# Patient Record
Sex: MALE | Race: BLACK OR AFRICAN AMERICAN | NOT HISPANIC OR LATINO
[De-identification: names, ages, dates, MRNs, and addresses within clinical notes are randomized per-mention and may not be internally consistent; named-entity substitution may affect disease eponyms.]

---

## 2018-09-04 ENCOUNTER — APPOINTMENT (OUTPATIENT)
Dept: ORTHOPEDIC SURGERY | Facility: CLINIC | Age: 63
End: 2018-09-04

## 2018-09-04 PROBLEM — Z00.00 ENCOUNTER FOR PREVENTIVE HEALTH EXAMINATION: Status: ACTIVE | Noted: 2018-09-04

## 2021-06-01 ENCOUNTER — INPATIENT (INPATIENT)
Facility: HOSPITAL | Age: 66
LOS: 3 days | Discharge: HOME HEALTH | DRG: 553 | End: 2021-06-05
Attending: STUDENT IN AN ORGANIZED HEALTH CARE EDUCATION/TRAINING PROGRAM | Admitting: INTERNAL MEDICINE
Payer: COMMERCIAL

## 2021-06-01 VITALS
SYSTOLIC BLOOD PRESSURE: 134 MMHG | WEIGHT: 169.98 LBS | HEART RATE: 78 BPM | TEMPERATURE: 99 F | DIASTOLIC BLOOD PRESSURE: 65 MMHG | RESPIRATION RATE: 18 BRPM | OXYGEN SATURATION: 99 % | HEIGHT: 76 IN

## 2021-06-01 DIAGNOSIS — R63.8 OTHER SYMPTOMS AND SIGNS CONCERNING FOOD AND FLUID INTAKE: ICD-10-CM

## 2021-06-01 DIAGNOSIS — N18.6 END STAGE RENAL DISEASE: ICD-10-CM

## 2021-06-01 DIAGNOSIS — Z29.9 ENCOUNTER FOR PROPHYLACTIC MEASURES, UNSPECIFIED: ICD-10-CM

## 2021-06-01 DIAGNOSIS — M25.462 EFFUSION, LEFT KNEE: ICD-10-CM

## 2021-06-01 DIAGNOSIS — D64.9 ANEMIA, UNSPECIFIED: ICD-10-CM

## 2021-06-01 DIAGNOSIS — Z89.422 ACQUIRED ABSENCE OF OTHER LEFT TOE(S): Chronic | ICD-10-CM

## 2021-06-01 DIAGNOSIS — I10 ESSENTIAL (PRIMARY) HYPERTENSION: ICD-10-CM

## 2021-06-01 DIAGNOSIS — E11.9 TYPE 2 DIABETES MELLITUS WITHOUT COMPLICATIONS: ICD-10-CM

## 2021-06-01 LAB
ALBUMIN SERPL ELPH-MCNC: 3.7 G/DL — SIGNIFICANT CHANGE UP (ref 3.3–5)
ALP SERPL-CCNC: 234 U/L — HIGH (ref 40–120)
ALT FLD-CCNC: 45 U/L — SIGNIFICANT CHANGE UP (ref 10–45)
ANION GAP SERPL CALC-SCNC: 14 MMOL/L — SIGNIFICANT CHANGE UP (ref 5–17)
ANISOCYTOSIS BLD QL: SLIGHT — SIGNIFICANT CHANGE UP
APTT BLD: 32.9 SEC — SIGNIFICANT CHANGE UP (ref 27.5–35.5)
AST SERPL-CCNC: 26 U/L — SIGNIFICANT CHANGE UP (ref 10–40)
B PERT IGG+IGM PNL SER: SIGNIFICANT CHANGE UP
BASOPHILS # BLD AUTO: 0.07 K/UL — SIGNIFICANT CHANGE UP (ref 0–0.2)
BASOPHILS NFR BLD AUTO: 0.9 % — SIGNIFICANT CHANGE UP (ref 0–2)
BILIRUB SERPL-MCNC: 0.4 MG/DL — SIGNIFICANT CHANGE UP (ref 0.2–1.2)
BUN SERPL-MCNC: 25 MG/DL — HIGH (ref 7–23)
BURR CELLS BLD QL SMEAR: PRESENT — SIGNIFICANT CHANGE UP
CALCIUM SERPL-MCNC: 9.2 MG/DL — SIGNIFICANT CHANGE UP (ref 8.4–10.5)
CHLORIDE SERPL-SCNC: 92 MMOL/L — LOW (ref 96–108)
CO2 SERPL-SCNC: 26 MMOL/L — SIGNIFICANT CHANGE UP (ref 22–31)
COLOR FLD: SIGNIFICANT CHANGE UP
CREAT SERPL-MCNC: 4.47 MG/DL — HIGH (ref 0.5–1.3)
EOSINOPHIL # BLD AUTO: 0.06 K/UL — SIGNIFICANT CHANGE UP (ref 0–0.5)
EOSINOPHIL NFR BLD AUTO: 0.8 % — SIGNIFICANT CHANGE UP (ref 0–6)
FLUID INTAKE SUBSTANCE CLASS: SIGNIFICANT CHANGE UP
FLUID SEGMENTED GRANULOCYTES: 95 % — SIGNIFICANT CHANGE UP
GIANT PLATELETS BLD QL SMEAR: PRESENT — SIGNIFICANT CHANGE UP
GLUCOSE BLDC GLUCOMTR-MCNC: 311 MG/DL — HIGH (ref 70–99)
GLUCOSE FLD-MCNC: 215 MG/DL — SIGNIFICANT CHANGE UP
GLUCOSE SERPL-MCNC: 325 MG/DL — HIGH (ref 70–99)
GRAM STN FLD: SIGNIFICANT CHANGE UP
HCT VFR BLD CALC: 28.8 % — LOW (ref 39–50)
HGB BLD-MCNC: 9.1 G/DL — LOW (ref 13–17)
HYPOCHROMIA BLD QL: SLIGHT — SIGNIFICANT CHANGE UP
INR BLD: 1.32 — HIGH (ref 0.88–1.16)
LYMPHOCYTES # BLD AUTO: 0.46 K/UL — LOW (ref 1–3.3)
LYMPHOCYTES # BLD AUTO: 6.1 % — LOW (ref 13–44)
MACROCYTES BLD QL: SLIGHT — SIGNIFICANT CHANGE UP
MANUAL SMEAR VERIFICATION: SIGNIFICANT CHANGE UP
MCHC RBC-ENTMCNC: 26.8 PG — LOW (ref 27–34)
MCHC RBC-ENTMCNC: 31.6 GM/DL — LOW (ref 32–36)
MCV RBC AUTO: 84.7 FL — SIGNIFICANT CHANGE UP (ref 80–100)
MONOCYTES # BLD AUTO: 0.46 K/UL — SIGNIFICANT CHANGE UP (ref 0–0.9)
MONOCYTES NFR BLD AUTO: 6.1 % — SIGNIFICANT CHANGE UP (ref 2–14)
MONOS+MACROS # FLD: 5 % — SIGNIFICANT CHANGE UP
NEUTROPHILS # BLD AUTO: 6.42 K/UL — SIGNIFICANT CHANGE UP (ref 1.8–7.4)
NEUTROPHILS NFR BLD AUTO: 86.1 % — HIGH (ref 43–77)
OVALOCYTES BLD QL SMEAR: SLIGHT — SIGNIFICANT CHANGE UP
PLAT MORPH BLD: ABNORMAL
PLATELET # BLD AUTO: 288 K/UL — SIGNIFICANT CHANGE UP (ref 150–400)
POIKILOCYTOSIS BLD QL AUTO: SLIGHT — SIGNIFICANT CHANGE UP
POLYCHROMASIA BLD QL SMEAR: SLIGHT — SIGNIFICANT CHANGE UP
POTASSIUM SERPL-MCNC: 4.4 MMOL/L — SIGNIFICANT CHANGE UP (ref 3.5–5.3)
POTASSIUM SERPL-SCNC: 4.4 MMOL/L — SIGNIFICANT CHANGE UP (ref 3.5–5.3)
PROT FLD-MCNC: 5.4 G/DL — SIGNIFICANT CHANGE UP
PROT SERPL-MCNC: 8 G/DL — SIGNIFICANT CHANGE UP (ref 6–8.3)
PROTHROM AB SERPL-ACNC: 15.6 SEC — HIGH (ref 10.6–13.6)
RBC # BLD: 3.4 M/UL — LOW (ref 4.2–5.8)
RBC # FLD: 15.8 % — HIGH (ref 10.3–14.5)
RBC BLD AUTO: ABNORMAL
RCV VOL RI: HIGH /UL (ref 0–0)
SARS-COV-2 RNA SPEC QL NAA+PROBE: NEGATIVE — SIGNIFICANT CHANGE UP
SODIUM SERPL-SCNC: 132 MMOL/L — LOW (ref 135–145)
SPECIMEN SOURCE: SIGNIFICANT CHANGE UP
SPHEROCYTES BLD QL SMEAR: SLIGHT — SIGNIFICANT CHANGE UP
SYNOVIAL CRYSTALS CLARITY: ABNORMAL
SYNOVIAL CRYSTALS COLOR: ABNORMAL
SYNOVIAL CRYSTALS ID: SIGNIFICANT CHANGE UP
SYNOVIAL CRYSTALS TUBE: SIGNIFICANT CHANGE UP
TOTAL NUCLEATED CELL COUNT, BODY FLUID: SIGNIFICANT CHANGE UP /UL
TUBE TYPE: SIGNIFICANT CHANGE UP
URATE SERPL-MCNC: 2.8 MG/DL — LOW (ref 3.4–8.8)
WBC # BLD: 7.46 K/UL — SIGNIFICANT CHANGE UP (ref 3.8–10.5)
WBC # FLD AUTO: 7.46 K/UL — SIGNIFICANT CHANGE UP (ref 3.8–10.5)

## 2021-06-01 PROCEDURE — 93971 EXTREMITY STUDY: CPT | Mod: 26,LT

## 2021-06-01 PROCEDURE — 99221 1ST HOSP IP/OBS SF/LOW 40: CPT | Mod: GC

## 2021-06-01 PROCEDURE — 73562 X-RAY EXAM OF KNEE 3: CPT | Mod: 26,LT

## 2021-06-01 PROCEDURE — 99285 EMERGENCY DEPT VISIT HI MDM: CPT

## 2021-06-01 RX ORDER — SODIUM CHLORIDE 9 MG/ML
1000 INJECTION, SOLUTION INTRAVENOUS
Refills: 0 | Status: DISCONTINUED | OUTPATIENT
Start: 2021-06-01 | End: 2021-06-02

## 2021-06-01 RX ORDER — DEXTROSE 50 % IN WATER 50 %
15 SYRINGE (ML) INTRAVENOUS ONCE
Refills: 0 | Status: DISCONTINUED | OUTPATIENT
Start: 2021-06-01 | End: 2021-06-02

## 2021-06-01 RX ORDER — DEXTROSE 50 % IN WATER 50 %
12.5 SYRINGE (ML) INTRAVENOUS ONCE
Refills: 0 | Status: DISCONTINUED | OUTPATIENT
Start: 2021-06-01 | End: 2021-06-02

## 2021-06-01 RX ORDER — DEXTROSE 50 % IN WATER 50 %
25 SYRINGE (ML) INTRAVENOUS ONCE
Refills: 0 | Status: DISCONTINUED | OUTPATIENT
Start: 2021-06-01 | End: 2021-06-02

## 2021-06-01 RX ORDER — INSULIN LISPRO 100/ML
VIAL (ML) SUBCUTANEOUS
Refills: 0 | Status: DISCONTINUED | OUTPATIENT
Start: 2021-06-01 | End: 2021-06-02

## 2021-06-01 RX ORDER — GLUCAGON INJECTION, SOLUTION 0.5 MG/.1ML
1 INJECTION, SOLUTION SUBCUTANEOUS ONCE
Refills: 0 | Status: DISCONTINUED | OUTPATIENT
Start: 2021-06-01 | End: 2021-06-05

## 2021-06-01 RX ADMIN — Medication 4: at 23:09

## 2021-06-01 NOTE — H&P ADULT - PROBLEM SELECTOR PLAN 3
Hx of HTN; unable to recall any meds  - med rec in AM w/ [facility]   - based on MAR will start hydral 100mg BID, Losartan 100mg and Nifedipine 90mg QD.

## 2021-06-01 NOTE — H&P ADULT - HISTORY OF PRESENT ILLNESS
A 67yo M with PMH of ESRD (TTS, last session today) via LUE fistula, T2DM, HTN, Afib? on Eliquis, L knee OA presents for recurrent Left knee effusion and pain. Patient says for the past two years he has had [id number] left knee effusion  2/2 OA s/p drainage multiple times; most recently at [place] 2 days ago. Since then pt has had increased swelling, pain and difficulty walking. He denies any fevers/chills, CP, SOB, abd pain, nausea/vomiting, diarhea. He has diffculty bearing weight on LLE 2/2 pain, and limited ROM.     In ED VS T98.9, HR 78, /65, RR 18 and SpO2 99% on RA. Labs s/f ESR 97, Hgb 9.1, Cr 4.47, , , . LLE doppler neg for DVT. L Knee xray with Suprapatellar effusion appreciated. Ortho consulted in ED s/p arthrocentesis; analysis not c/w septic joint. Pt with continued difficulty walking in ED; admitted for PT eval.

## 2021-06-01 NOTE — H&P ADULT - NSHPPHYSICALEXAM_GEN_ALL_CORE
.  VITAL SIGNS:  T(C): 36.8 (06-01-21 @ 22:56), Max: 37.2 (06-01-21 @ 13:13)  T(F): 98.3 (06-01-21 @ 22:56), Max: 98.9 (06-01-21 @ 13:13)  HR: 68 (06-01-21 @ 22:56) (68 - 78)  BP: 150/73 (06-01-21 @ 22:56) (134/65 - 150/73)  BP(mean): --  RR: 18 (06-01-21 @ 22:56) (18 - 18)  SpO2: 97% (06-01-21 @ 22:56) (97% - 99%)  Wt(kg): --    PHYSICAL EXAM:    Constitutional: WDWN resting comfortably in bed; NAD  Head: NC/AT  Eyes: PERRL, EOMI, anicteric sclera  ENT: no nasal discharge; uvula midline, no oropharyngeal erythema or exudates; MMM  Neck: supple; no JVD or thyromegaly  Respiratory: CTA B/L; no W/R/R, no retractions  Cardiac: +S1/S2; RRR; no M/R/G; PMI non-displaced  Gastrointestinal: soft, NT/ND; no rebound or guarding; +BSx4  Genitourinary: normal external genitalia  Back: spine midline, no bony tenderness or step-offs; no CVAT B/L  Extremities: WWP, no clubbing or cyanosis; no peripheral edema  Musculoskeletal: NROM x4; no joint swelling, tenderness or erythema  Vascular: 2+ radial, femoral, DP/PT pulses B/L  Dermatologic: skin warm, dry and intact; no rashes, wounds, or scars  Lymphatic: no submandibular or cervical LAD  Neurologic: AAOx3; CNII-XII grossly intact; no focal deficits  Psychiatric: affect and characteristics of appearance, verbalizations, behaviors are appropriate .  VITAL SIGNS:  T(C): 36.8 (06-01-21 @ 22:56), Max: 37.2 (06-01-21 @ 13:13)  T(F): 98.3 (06-01-21 @ 22:56), Max: 98.9 (06-01-21 @ 13:13)  HR: 68 (06-01-21 @ 22:56) (68 - 78)  BP: 150/73 (06-01-21 @ 22:56) (134/65 - 150/73)  BP(mean): --  RR: 18 (06-01-21 @ 22:56) (18 - 18)  SpO2: 97% (06-01-21 @ 22:56) (97% - 99%)  Wt(kg): --    PHYSICAL EXAM:    Constitutional: WDWN resting comfortably in bed; NAD  Head: NC/AT  Eyes: PERRL, EOMI, anicteric sclera  ENT: no nasal discharge; uvula midline, no oropharyngeal erythema or exudates; MMM  Neck: supple; no JVD or thyromegaly  Respiratory: CTA B/L; no W/R/R, no retractions  Cardiac: +S1/S2; RRR; no M/R/G; PMI non-displaced  Gastrointestinal: soft, NT/ND; no rebound or guarding; +BSx4; +peritoneal dialysis catheter in place.   Back: spine midline, no bony tenderness or step-offs; no CVAT B/L  Extremities: WWP, no clubbing or cyanosis; no peripheral edema; +LUE patent AVF  Neurologic: AAOx3; CNII-XII grossly intact; no focal deficits

## 2021-06-01 NOTE — ED ADULT TRIAGE NOTE - CHIEF COMPLAINT QUOTE
Patient reports chronic left knee pain, states "fluid was removed two days ago from knee in [facility]," reports no improvement of symptoms. Dialysis patient, last HD today, HD T, Thu, Sat. Denies chest pain, SOB, fever or chills.

## 2021-06-01 NOTE — H&P ADULT - PROBLEM SELECTOR PLAN 6
F: none   E: replete prn   N: Renal hgb 9.1 on admission likely 2/2 ESRD; no previous to compare  - f/up iron studies hgb 9.1 on admission likely 2/2 ESRD; no previous to compare.   - continue to monitor   - f/up iron studies  - active T/S

## 2021-06-01 NOTE — H&P ADULT - PROBLEM SELECTOR PLAN 2
ESRD (TTS, last session today) via LUE fistula however being prepped for peritoneal dialysis. euvolemic currently  - [place]ro [email or web address]

## 2021-06-01 NOTE — ED PROVIDER NOTE - CLINICAL SUMMARY MEDICAL DECISION MAKING FREE TEXT BOX
Patient presents to ED with concern for left knee pain and swelling - acute on chronic.  Notes recent tap at OSH, however states effusion returned and he is now having difficulty ambulating.  Labs reviewed.  US and x ray completed and reports reviewed.  Orthopedic team consulted and in ED to evaluate patient.  Ortho without concern for septic joint at this time and with no further ED recommendations - will follow on admission.  All results are discussed with patient and he is offered discharge with outpatient ortho follow up, or admission with PT/OT eval, etc.  Patient states he does not feel comfortable with discharge given his gait instability and discomfort.  Will admit to medicine for PT/OT at this time.  Patient is aware of plan and in agreement.

## 2021-06-01 NOTE — ED PROVIDER NOTE - OBJECTIVE STATEMENT
66 year old male presents to ED with concern for effusion to left knee.  Patient notes chronic effusion, stating he was seen at OSH ED several days ago and had knee drained.  He was advised to follow up with orthopedic team, however states the closest appointment he is able to get is for August.  He notes fluid seems to have re accumulated in his knee and he notes he is having difficulty ambulating secondary to the discomfort.  He denies associated fever, chills, chest pain, shortness of breath, abdominal pain, nausea, emesis, changes to bowel movements, rashes, recent travel, known sick contacts or any additional acute complaints or concerns at this time.

## 2021-06-01 NOTE — CONSULT NOTE ADULT - SUBJECTIVE AND OBJECTIVE BOX
Orthopaedic Surgery Consult Note    For Surgeon: Danielle    HPI:  Patient is a 66y old  Male PMH DM, ESRD on dialysis who presents with a chief complaint of L knee recurrent effusions and pain with associated difficulty walking. States he has had this issue for 3 years. Most recently aspirated at [facility] 2 years ago and was discharged home- states he does not recall any abnormal findings. Reports difficulty ranging knee and bearing weight but no fever, chills, SOB.      Allergies    No Known Allergies    Intolerances      PAST MEDICAL & SURGICAL HISTORY:    MEDICATIONS  (STANDING):    MEDICATIONS  (PRN):      Vital Signs Last 24 Hrs  T(C): 37.2 (01 Jun 2021 18:48), Max: 37.2 (01 Jun 2021 13:13)  T(F): 98.9 (01 Jun 2021 18:48), Max: 98.9 (01 Jun 2021 13:13)  HR: 72 (01 Jun 2021 18:48) (72 - 78)  BP: 149/74 (01 Jun 2021 18:48) (134/65 - 149/76)  BP(mean): --  RR: 18 (01 Jun 2021 18:48) (18 - 18)  SpO2: 97% (01 Jun 2021 18:48) (97% - 99%)    Physical Exam:  General: Resting comfortably on stretcher, calm, NAD  LLE: Gross effusion. No erythema or warmth. ROM 10-70 with pain with increasing flexion. ROM to 80 after aspiration                          9.1    7.46  )-----------( 288      ( 01 Jun 2021 14:16 )             28.8     06-01    132<L>  |  92<L>  |  25<H>  ----------------------------<  325<H>  4.4   |  26  |  4.47<H>    Ca    9.2      01 Jun 2021 14:16    TPro  8.0  /  Alb  3.7  /  TBili  0.4  /  DBili  x   /  AST  26  /  ALT  45  /  AlkPhos  234<H>  06-01    PT/INR - ( 01 Jun 2021 14:16 )   PT: 15.6 sec;   INR: 1.32          PTT - ( 01 Jun 2021 14:16 )  PTT:32.9 sec    Imaging: 3 views L knee demonstrate erosive change of medial femoral condyle and tibial plateau with osteophytes throughout knee. Suprapatellar effusion appreciated.    A/P: 66yMale PMH DM, ESRD on dialysis T/Th/Sat with recurrent L knee effusions and elevated inflammatory markers. Aspirated 80cc bloody fluid, sent for analysis for r/o septic joint  - Pain control  - F/u fluid analysis  - WBAT  - U/S negative for DVT  - Discussed with Dr. Santos    Ortho Pager [phone]   Orthopaedic Surgery Consult Note    For Surgeon: Danielle    HPI:  Patient is a 66y old  Male PMH DM, ESRD on dialysis who presents with a chief complaint of L knee recurrent effusions and pain with associated difficulty walking. States he has had this issue for 3 years. Most recently aspirated at [facility] 2 years ago and was discharged home- states he does not recall any abnormal findings. Reports difficulty ranging knee and bearing weight but no fever, chills, SOB.      Allergies    No Known Allergies    Intolerances      PAST MEDICAL & SURGICAL HISTORY:    MEDICATIONS  (STANDING):    MEDICATIONS  (PRN):      Vital Signs Last 24 Hrs  T(C): 37.2 (01 Jun 2021 18:48), Max: 37.2 (01 Jun 2021 13:13)  T(F): 98.9 (01 Jun 2021 18:48), Max: 98.9 (01 Jun 2021 13:13)  HR: 72 (01 Jun 2021 18:48) (72 - 78)  BP: 149/74 (01 Jun 2021 18:48) (134/65 - 149/76)  BP(mean): --  RR: 18 (01 Jun 2021 18:48) (18 - 18)  SpO2: 97% (01 Jun 2021 18:48) (97% - 99%)    Physical Exam:  General: Resting comfortably on stretcher, calm, NAD  LLE: Gross effusion. No erythema or warmth. ROM 10-70 with pain with increasing flexion. ROM to 80 after aspiration                          9.1    7.46  )-----------( 288      ( 01 Jun 2021 14:16 )             28.8     06-01    132<L>  |  92<L>  |  25<H>  ----------------------------<  325<H>  4.4   |  26  |  4.47<H>    Ca    9.2      01 Jun 2021 14:16    TPro  8.0  /  Alb  3.7  /  TBili  0.4  /  DBili  x   /  AST  26  /  ALT  45  /  AlkPhos  234<H>  06-01    PT/INR - ( 01 Jun 2021 14:16 )   PT: 15.6 sec;   INR: 1.32          PTT - ( 01 Jun 2021 14:16 )  PTT:32.9 sec    Imaging: 3 views L knee demonstrate erosive change of medial femoral condyle and tibial plateau with osteophytes throughout knee. Suprapatellar effusion appreciated.    A/P: 66yMale PMH DM, ESRD on dialysis T/Th/Sat with recurrent L knee effusions and elevated inflammatory markers. Aspirated 80cc bloody fluid, sent for analysis for r/o septic joint. Fluid analysis results do not support septic arthritis. No indication for acute surgical intervention at this time.  - Pain control  - Fluid analysis- WBC 32K, not consistent w/ septic joint (<50K)  - WBAT, encourage ROM  - U/S negative for DVT  - Compressive dressing, ice, elevation R knee.  - Discussed with Dr. Santos    Ortho Pager [phone]

## 2021-06-01 NOTE — H&P ADULT - PROBLEM SELECTOR PLAN 7
DVT: Eliquis, SCD  GI: none     FULL CODE F: none   E: replete prn   N: Renal F: none   E: replete prn   N: Renal, CC

## 2021-06-01 NOTE — H&P ADULT - ASSESSMENT
A 67yo M with PMH of ESRD (TTS, last session today) via LUE fistula, T2DM, HTN, Afib? on Eliquis, L knee OA presents for recurrent Left knee effusion and pain. A 67yo M with PMH of ESRD (TTS, last session today) via LUE fistula, T2DM, HTN, Afib? on Eliquis, L knee OA presents for recurrent Left knee effusion and pain.

## 2021-06-01 NOTE — ED PROVIDER NOTE - CARE PLAN
Principal Discharge DX:	Knee effusion, left   Principal Discharge DX:	Knee effusion, left  Secondary Diagnosis:	Gait instability

## 2021-06-01 NOTE — H&P ADULT - PROBLEM SELECTOR PLAN 1
Pt with hx of recurrent left knee effusion 2/2 OA s/p drainage multiple times; most recently at [place] 2 days ago. Presents with worsening swelling, pain and difficulty walking since then.  LLE doppler neg for DVT. L Knee xray with Suprapatellar effusion appreciated. s/p arthrocentesis w/ WBC 38K, neutrophil <50%, unlikely septic however possible inflammatory process. No crystals seen. In addition with blood tinged fluid, RBC>6K; likely bloody tap; does not appear hemiarthrosis. Effusion likely 2/2 OA, less likely gout/pseudogout, unilkely .. Pt with continued difficulty walking in ED; admitted for PT eval.   - f/up ortho recs   - PT eval in AM  - pain control Pt with hx of recurrent left knee effusion 2/2 OA s/p drainage multiple times; most recently at [place] 2 days ago. Presents with worsening swelling, pain and difficulty walking since then.  LLE doppler neg for DVT. L Knee xray with Suprapatellar effusion appreciated. s/p arthrocentesis w/ WBC 38K, neutrophil <50%, unlikely septic however possible inflammatory process. No crystals seen. Effusion likely 2/2 OA, less likely gout/pseudogout. In addition, with blood tinged fluid, RBC>6K; does not appear hemiarthrosis per ortho however pt on eliquis and may have contributed to quick re effusion?  -Pt with continued difficulty walking in ED; admitted for PT eval.   - f/up ortho recs   - PT eval in AM  - pain control Pt with hx of recurrent left knee effusion 2/2 OA s/p drainage multiple times; most recently at [place] 2 days ago. Presents with worsening swelling, pain and difficulty walking since then.  LLE doppler neg for DVT. L Knee xray with Suprapatellar effusion appreciated. s/p arthrocentesis w/ WBC 38K, neutrophil <50%, unlikely septic, possible inflammatory process. No crystals seen. Effusion likely 2/2 OA, less likely gout/pseudogout, RA. In addition, with blood tinged fluid, RBC>60K; does not appear hemiarthrosis per ortho however pt on eliquis and may have contributed to quick re effusion? Pt with continued difficulty walking in ED; admitted for PT eval  - s/p arthocentsis in ED   - f/up ortho recs  - consider outpt MRI for recurrent effusions   - PT eval in AM  - pain control  - restart eliquis samuel Pt with hx of recurrent left knee effusion 2/2 OA s/p drainage multiple times; most recently at [place] 2 days ago. Presents with worsening swelling, pain and difficulty walking since then.  LLE doppler neg for DVT. L Knee xray with Suprapatellar effusion appreciated. s/p arthrocentesis w/ WBC 38K, neutrophil <50%, unlikely septic, possible inflammatory process. No crystals seen. Effusion likely 2/2 OA, less likely gout/pseudogout, RA. In addition, with blood tinged fluid, RBC>60K; does not appear hemiarthrosis per ortho however pt on eliquis and may have contributed to quick re effusion? No hx of bleeding disorders. Pt with continued difficulty walking in ED; admitted for PT eval  - s/p arthrocentesis in ED   - f/up ortho recs  - consider outpt MRI for recurrent effusions   - PT eval in AM  - pain control  - restart eliquis in AM Pt with hx of recurrent left knee effusion 2/2 OA s/p drainage multiple times; most recently at [place] 2 days ago. Presents with worsening swelling, pain and difficulty walking since then.  LLE doppler neg for DVT. L Knee xray with Suprapatellar effusion appreciated. s/p arthrocentesis w/ WBC 38K, neutrophil <50%, unlikely septic, possible inflammatory process. No crystals seen. Effusion likely 2/2 OA, less likely gout/pseudogout, RA. In addition, with blood tinged fluid, RBC>60K; does not appear hemiarthrosis per ortho however pt on eliquis and may have contributed to quick re effusion? No hx of bleeding disorders. Pt with continued difficulty walking in ED; admitted for PT eval  - s/p arthrocentesis in ED   - f/up ortho recs  - consider outpt MRI for recurrent effusions   - PT eval in AM  - pain control  - restart eliquis tomm if knee exam stable

## 2021-06-01 NOTE — H&P ADULT - PROBLEM SELECTOR PLAN 5
hgb 9.1 on admission likely 2/2 ESRD; no previous to compare  - f/up iron studies Previously on insulin however d/tracey outpt. FSG on admission 300, AG 14  - med rec in AM  - f/up a1c  - c/w ISS  - continue to monitor

## 2021-06-01 NOTE — H&P ADULT - NSHPLABSRESULTS_GEN_ALL_CORE
.  LABS:                         9.1    7.46  )-----------( 288      ( 01 Jun 2021 14:16 )             28.8     06-01    132<L>  |  92<L>  |  25<H>  ----------------------------<  325<H>  4.4   |  26  |  4.47<H>    Ca    9.2      01 Jun 2021 14:16  Phos  2.7     06-01  Mg     2.1     06-01    TPro  8.0  /  Alb  3.7  /  TBili  0.4  /  DBili  x   /  AST  26  /  ALT  45  /  AlkPhos  234<H>  06-01    PT/INR - ( 01 Jun 2021 14:16 )   PT: 15.6 sec;   INR: 1.32          PTT - ( 01 Jun 2021 14:16 )  PTT:32.9 sec              RADIOLOGY, EKG & ADDITIONAL TESTS: Reviewed.

## 2021-06-01 NOTE — ED PROVIDER NOTE - PHYSICAL EXAMINATION
VITAL SIGNS: I have reviewed nursing notes and confirm.  CONSTITUTIONAL: Well-developed; well-nourished; in no acute distress.   SKIN:  Warm and dry, no acute rash.   HEAD:  normocephalic, atraumatic.  EYES: PERRL.  EOM intact; conjunctiva and sclera clear.  ENT: No nasal discharge; airway clear.   NECK: Supple; non tender.  CARD: S1, S2 normal; no murmurs, gallops, or rubs. Regular rate and rhythm.   RESP:  Clear to auscultation b/l, no wheezes, rales or rhonchi.  ABD: Normal bowel sounds; soft; non-distended; non-tender; no guarding/rebound.  EXT: + Pain with flexion/extension of left knee, + large effusion to left knee with overlying warmth, no hyperemia, no streaking or skin breakdown to overlying area.  Normal ROM to right LE and bilateral UEs.    NEURO: Alert, oriented, grossly unremarkable.  5/5 strength x 4 extremities against gravity and external force.  No drift x 4 extremities.  Sensation intact and symmetric x 4 extremities.  No facial asymmetry.    PSYCH: Cooperative, mood and affect appropriate.

## 2021-06-01 NOTE — ED ADULT NURSE NOTE - CHIEF COMPLAINT QUOTE
Patient reports chronic left knee pain, states "fluid was removed two days ago from knee in [facility]," reports no improvement of symptoms. Dialysis patient, last HD today, HD T, Thu, Sat. Denies chest pain, SOB, fever or chills.

## 2021-06-01 NOTE — H&P ADULT - PROBLEM SELECTOR PLAN 4
Previously on insulin however d/tracey due to hypoglycemia? FSG on admission 300, AG 14  - med rec in AM  - f/up a1c  - c/w ISS  - continue to monitor Hx of afib. currently rate controlled  - c/w home coreg 25mg BID   - restart eliquis in AM Hx of afib. currently NSR  - c/w home coreg 25mg BID   - restart eliquis in AM    #twave inversions: Patient with large twave inversions particulary in left precordial leads as well as apical leads. No current CP/SOB, SOLIS. Not isolated to precordial leads consistent with wellens; possible apical HCM? No previous EKGs or echo to compare  - obtain collateral in AM  - f/up echocardiogram Hx of afib. currently NSR  - c/w home coreg 25mg BID   - restart eliquis in AM    #twave inversions: Patient with large twave inversions particularly in left precordial leads. Not isolated to anterior leads consistent with wellens and No current CP/SOB, SOLIS. Likely LVH with strain pattern vs possible apical HCM? No previous EKGs or echo to compare  - obtain collateral in AM  - f/up echocardiogram Hx of afib. currently NSR  - c/w home coreg 25mg BID   - restart eliquis in AM    #twave inversions: Patient with large twave inversions particularly in left precordial leads. Not isolated to anterior leads consistent with wellens and No current CP/SOB, SOLIS. Likely LVH with strain pattern vs possible apical HCM? No previous EKGs or echo to compare  - obtain collateral in AM  - f/up trops, ck, ckmb   - f/up echocardiogram

## 2021-06-01 NOTE — ED PROVIDER NOTE - NS ED ROS FT
Constitutional: No fever or chills.   Eyes: No pain, blurry vision, or discharge.  ENMT: No hearing changes, pain, discharge or infections. No neck pain or stiffness.  Cardiac: No chest pain, SOB or edema. No chest pain with exertion.  Respiratory: No cough or respiratory distress. No hemoptysis. No history of asthma or RAD.  GI: No nausea, vomiting, diarrhea or abdominal pain.  : No dysuria, frequency or burning.  MS: No myalgia, muscle weakness or back pain.  + Pain and swelling to left knee.    Neuro: No headache or weakness. No LOC.  Skin: No skin rash.   Endocrine: No history of thyroid disease or diabetes.  Except as documented in the HPI, all other systems are negative.

## 2021-06-01 NOTE — ED ADULT NURSE NOTE - OBJECTIVE STATEMENT
Patient presents to the ED complaining of left knee pain and swelling. Patient was seen in [facility] ED two days ago and they drained his knee but the pain has gotten worst since. Swelling and warm to touch. Denies any fever. Patient presents to the ED complaining of left knee pain and swelling. Patient was seen in [facility] ED two days ago and they drained his knee but the pain has gotten worst since. Swelling and warm to touch. Denies any fever.  Dialysis Tues, thurs, sat, last dialysis today. Fistula to left arm.

## 2021-06-01 NOTE — H&P ADULT - ATTENDING COMMENTS
[ ] Chronic Knee Effusion   -Patient w/ chronic effusion purported to be 2/2 OA – though, X-ray demonstrates not severe.   -Recently tapped at [facility] 2 days prior – presented w/ pain, no systemic signs, and re-accumulated knee swelling.   -S/p repeat tap in ER – not c/w septic joint. Elevated RBCs – per Ortho, sero-sanguinous, not walter blood – likely d/t recent tap while patient on Eliquis.   -Patient reports difficult ambulating – for PT Eval.   -Question for Ortho re any utility in MRI (vs outpatient) for further eval.   -Uric Acid Low. No symptoms suggest of RA.     [ ] Hyperglycemia  -a1c 7.7% - previously on insulin   -F/S > 300 in ER – given 7u regular insulin x 1   -C/w MISS    [ ] Abnormal EKG   -Patient w/ widespread deep T wave inversions   -No CP, No SOB  -No baseline EKG  -F/u Add on Trop – note, patient w/ ESRD.   -Deep inversions seen in Wellen’s Sign (Critical LAD unlikely w/ absence of anginal symptoms) or HoCM.   -ECHO ordered – try to obtain collateral re prior cardiac history. [ ] Chronic Knee Effusion   -Patient w/ chronic effusion purported to be 2/2 OA – though, X-ray demonstrates not severe.   -Recently tapped at [facility] 2 days prior – presented w/ pain, no systemic signs, and re-accumulated knee swelling.   -S/p repeat tap in ER – not c/w septic joint. Elevated RBCs – per Ortho, sero-sanguinous, not walter blood – likely d/t recent tap while patient on Eliquis.   -Patient reports difficult ambulating – for PT Eval.   -Question for Ortho re any utility in MRI (vs outpatient) for further eval.   -Uric Acid Low. No symptoms suggest of RA.     [ ] Hyperglycemia  -a1c 7.7% - previously on insulin   -F/S > 300 in ER – given 7u regular insulin x 1   -C/w MISS    [ ] Abnormal EKG   -Patient w/ widespread deep T wave inversions   -No CP, No SOB at present – difficult to elicit any chronic history, patient is a poor historian.   -No baseline EKG  -Note, patient w/ ESRD BUT Add on Trop was 0.45 – f/u AM Trop.   -Deep inversions seen in Wellen’s Sign (Critical LAD unlikely w/ absence of anginal symptoms) or HoCM.   -ECHO ordered – try to obtain collateral re prior cardiac history.   -Cardiology consult. In meantime, as stated, patient w/o symptoms and will c/t trend cardiac enzymes.

## 2021-06-02 DIAGNOSIS — I48.91 UNSPECIFIED ATRIAL FIBRILLATION: ICD-10-CM

## 2021-06-02 LAB
ANION GAP SERPL CALC-SCNC: 13 MMOL/L — SIGNIFICANT CHANGE UP (ref 5–17)
BUN SERPL-MCNC: 39 MG/DL — HIGH (ref 7–23)
CALCIUM SERPL-MCNC: 8.7 MG/DL — SIGNIFICANT CHANGE UP (ref 8.4–10.5)
CHLORIDE SERPL-SCNC: 95 MMOL/L — LOW (ref 96–108)
CK MB CFR SERPL CALC: 2.3 NG/ML — SIGNIFICANT CHANGE UP (ref 0–6.7)
CK MB CFR SERPL CALC: 3.8 NG/ML — SIGNIFICANT CHANGE UP (ref 0–6.7)
CK SERPL-CCNC: 132 U/L — SIGNIFICANT CHANGE UP (ref 30–200)
CK SERPL-CCNC: 193 U/L — SIGNIFICANT CHANGE UP (ref 30–200)
CO2 SERPL-SCNC: 26 MMOL/L — SIGNIFICANT CHANGE UP (ref 22–31)
COVID-19 SPIKE DOMAIN AB INTERP: POSITIVE
COVID-19 SPIKE DOMAIN ANTIBODY RESULT: >250 U/ML — HIGH
CREAT SERPL-MCNC: 5.99 MG/DL — HIGH (ref 0.5–1.3)
FERRITIN SERPL-MCNC: 453 NG/ML — HIGH (ref 30–400)
GLUCOSE BLDC GLUCOMTR-MCNC: 138 MG/DL — HIGH (ref 70–99)
GLUCOSE BLDC GLUCOMTR-MCNC: 180 MG/DL — HIGH (ref 70–99)
GLUCOSE BLDC GLUCOMTR-MCNC: 195 MG/DL — HIGH (ref 70–99)
GLUCOSE BLDC GLUCOMTR-MCNC: 331 MG/DL — HIGH (ref 70–99)
GLUCOSE BLDC GLUCOMTR-MCNC: 338 MG/DL — HIGH (ref 70–99)
GLUCOSE SERPL-MCNC: 126 MG/DL — HIGH (ref 70–99)
HCT VFR BLD CALC: 26.9 % — LOW (ref 39–50)
HCV AB S/CO SERPL IA: 0.03 S/CO — SIGNIFICANT CHANGE UP
HCV AB SERPL-IMP: SIGNIFICANT CHANGE UP
HGB BLD-MCNC: 8.5 G/DL — LOW (ref 13–17)
IRON SATN MFR SERPL: 13 % — LOW (ref 16–55)
IRON SATN MFR SERPL: 18 UG/DL — LOW (ref 45–165)
MAGNESIUM SERPL-MCNC: 2.1 MG/DL — SIGNIFICANT CHANGE UP (ref 1.6–2.6)
MCHC RBC-ENTMCNC: 26.6 PG — LOW (ref 27–34)
MCHC RBC-ENTMCNC: 31.6 GM/DL — LOW (ref 32–36)
MCV RBC AUTO: 84.1 FL — SIGNIFICANT CHANGE UP (ref 80–100)
NRBC # BLD: 0 /100 WBCS — SIGNIFICANT CHANGE UP (ref 0–0)
PHOSPHATE SERPL-MCNC: 4.5 MG/DL — SIGNIFICANT CHANGE UP (ref 2.5–4.5)
PLATELET # BLD AUTO: 273 K/UL — SIGNIFICANT CHANGE UP (ref 150–400)
POTASSIUM SERPL-MCNC: 4.5 MMOL/L — SIGNIFICANT CHANGE UP (ref 3.5–5.3)
POTASSIUM SERPL-SCNC: 4.5 MMOL/L — SIGNIFICANT CHANGE UP (ref 3.5–5.3)
RBC # BLD: 3.2 M/UL — LOW (ref 4.2–5.8)
RBC # FLD: 15.8 % — HIGH (ref 10.3–14.5)
SARS-COV-2 IGG+IGM SERPL QL IA: >250 U/ML — HIGH
SARS-COV-2 IGG+IGM SERPL QL IA: POSITIVE
SODIUM SERPL-SCNC: 134 MMOL/L — LOW (ref 135–145)
TIBC SERPL-MCNC: 141 UG/DL — LOW (ref 220–430)
TROPONIN T SERPL-MCNC: 0.42 NG/ML — CRITICAL HIGH (ref 0–0.01)
TROPONIN T SERPL-MCNC: 0.45 NG/ML — CRITICAL HIGH (ref 0–0.01)
UIBC SERPL-MCNC: 123 UG/DL — SIGNIFICANT CHANGE UP (ref 110–370)
WBC # BLD: 6.39 K/UL — SIGNIFICANT CHANGE UP (ref 3.8–10.5)
WBC # FLD AUTO: 6.39 K/UL — SIGNIFICANT CHANGE UP (ref 3.8–10.5)

## 2021-06-02 PROCEDURE — 99233 SBSQ HOSP IP/OBS HIGH 50: CPT | Mod: GC

## 2021-06-02 PROCEDURE — 93306 TTE W/DOPPLER COMPLETE: CPT | Mod: 26

## 2021-06-02 PROCEDURE — 99221 1ST HOSP IP/OBS SF/LOW 40: CPT

## 2021-06-02 RX ORDER — INSULIN GLARGINE 100 [IU]/ML
10 INJECTION, SOLUTION SUBCUTANEOUS AT BEDTIME
Refills: 0 | Status: DISCONTINUED | OUTPATIENT
Start: 2021-06-02 | End: 2021-06-03

## 2021-06-02 RX ORDER — LOSARTAN POTASSIUM 100 MG/1
100 TABLET, FILM COATED ORAL EVERY 24 HOURS
Refills: 0 | Status: DISCONTINUED | OUTPATIENT
Start: 2021-06-02 | End: 2021-06-05

## 2021-06-02 RX ORDER — ACETAMINOPHEN 500 MG
650 TABLET ORAL EVERY 6 HOURS
Refills: 0 | Status: DISCONTINUED | OUTPATIENT
Start: 2021-06-02 | End: 2021-06-05

## 2021-06-02 RX ORDER — INSULIN LISPRO 100/ML
VIAL (ML) SUBCUTANEOUS
Refills: 0 | Status: DISCONTINUED | OUTPATIENT
Start: 2021-06-02 | End: 2021-06-03

## 2021-06-02 RX ORDER — CARVEDILOL PHOSPHATE 80 MG/1
25 CAPSULE, EXTENDED RELEASE ORAL EVERY 12 HOURS
Refills: 0 | Status: DISCONTINUED | OUTPATIENT
Start: 2021-06-02 | End: 2021-06-05

## 2021-06-02 RX ORDER — ATORVASTATIN CALCIUM 80 MG/1
20 TABLET, FILM COATED ORAL AT BEDTIME
Refills: 0 | Status: DISCONTINUED | OUTPATIENT
Start: 2021-06-02 | End: 2021-06-05

## 2021-06-02 RX ORDER — ISOSORBIDE MONONITRATE 60 MG/1
30 TABLET, EXTENDED RELEASE ORAL DAILY
Refills: 0 | Status: DISCONTINUED | OUTPATIENT
Start: 2021-06-03 | End: 2021-06-03

## 2021-06-02 RX ORDER — KETOTIFEN FUMARATE 0.34 MG/ML
1 SOLUTION OPHTHALMIC AT BEDTIME
Refills: 0 | Status: DISCONTINUED | OUTPATIENT
Start: 2021-06-02 | End: 2021-06-05

## 2021-06-02 RX ORDER — APIXABAN 2.5 MG/1
5 TABLET, FILM COATED ORAL EVERY 12 HOURS
Refills: 0 | Status: DISCONTINUED | OUTPATIENT
Start: 2021-06-02 | End: 2021-06-02

## 2021-06-02 RX ORDER — HYDRALAZINE HCL 50 MG
100 TABLET ORAL EVERY 12 HOURS
Refills: 0 | Status: DISCONTINUED | OUTPATIENT
Start: 2021-06-02 | End: 2021-06-03

## 2021-06-02 RX ORDER — BRIMONIDINE TARTRATE 2 MG/MG
1 SOLUTION/ DROPS OPHTHALMIC
Refills: 0 | Status: DISCONTINUED | OUTPATIENT
Start: 2021-06-02 | End: 2021-06-05

## 2021-06-02 RX ORDER — ASPIRIN/CALCIUM CARB/MAGNESIUM 324 MG
81 TABLET ORAL EVERY 24 HOURS
Refills: 0 | Status: DISCONTINUED | OUTPATIENT
Start: 2021-06-02 | End: 2021-06-02

## 2021-06-02 RX ORDER — ERGOCALCIFEROL 1.25 MG/1
1 CAPSULE ORAL
Qty: 0 | Refills: 0 | DISCHARGE

## 2021-06-02 RX ORDER — HYDRALAZINE HCL 50 MG
1 TABLET ORAL
Qty: 0 | Refills: 0 | DISCHARGE

## 2021-06-02 RX ORDER — AZELASTINE HCL 0.05 %
1 DROPS OPHTHALMIC (EYE)
Qty: 0 | Refills: 0 | DISCHARGE

## 2021-06-02 RX ORDER — INSULIN HUMAN 100 [IU]/ML
7 INJECTION, SOLUTION SUBCUTANEOUS ONCE
Refills: 0 | Status: COMPLETED | OUTPATIENT
Start: 2021-06-02 | End: 2021-06-02

## 2021-06-02 RX ORDER — AMLODIPINE BESYLATE 2.5 MG/1
1 TABLET ORAL
Qty: 0 | Refills: 0 | DISCHARGE

## 2021-06-02 RX ORDER — BRIMONIDINE TARTRATE 2 MG/MG
1 SOLUTION/ DROPS OPHTHALMIC
Qty: 0 | Refills: 0 | DISCHARGE

## 2021-06-02 RX ORDER — APIXABAN 2.5 MG/1
5 TABLET, FILM COATED ORAL EVERY 12 HOURS
Refills: 0 | Status: DISCONTINUED | OUTPATIENT
Start: 2021-06-02 | End: 2021-06-05

## 2021-06-02 RX ORDER — NIFEDIPINE 30 MG
90 TABLET, EXTENDED RELEASE 24 HR ORAL EVERY 24 HOURS
Refills: 0 | Status: DISCONTINUED | OUTPATIENT
Start: 2021-06-02 | End: 2021-06-05

## 2021-06-02 RX ADMIN — Medication 4: at 21:55

## 2021-06-02 RX ADMIN — ATORVASTATIN CALCIUM 20 MILLIGRAM(S): 80 TABLET, FILM COATED ORAL at 21:29

## 2021-06-02 RX ADMIN — Medication 90 MILLIGRAM(S): at 23:37

## 2021-06-02 RX ADMIN — CARVEDILOL PHOSPHATE 25 MILLIGRAM(S): 80 CAPSULE, EXTENDED RELEASE ORAL at 21:30

## 2021-06-02 RX ADMIN — Medication 100 MILLIGRAM(S): at 21:29

## 2021-06-02 RX ADMIN — Medication 90 MILLIGRAM(S): at 01:47

## 2021-06-02 RX ADMIN — APIXABAN 5 MILLIGRAM(S): 2.5 TABLET, FILM COATED ORAL at 23:37

## 2021-06-02 RX ADMIN — CARVEDILOL PHOSPHATE 25 MILLIGRAM(S): 80 CAPSULE, EXTENDED RELEASE ORAL at 09:48

## 2021-06-02 RX ADMIN — Medication 4: at 17:17

## 2021-06-02 RX ADMIN — Medication 100 MILLIGRAM(S): at 09:48

## 2021-06-02 RX ADMIN — KETOTIFEN FUMARATE 1 DROP(S): 0.34 SOLUTION OPHTHALMIC at 21:31

## 2021-06-02 RX ADMIN — BRIMONIDINE TARTRATE 1 DROP(S): 2 SOLUTION/ DROPS OPHTHALMIC at 06:34

## 2021-06-02 RX ADMIN — INSULIN GLARGINE 10 UNIT(S): 100 INJECTION, SOLUTION SUBCUTANEOUS at 22:33

## 2021-06-02 RX ADMIN — LOSARTAN POTASSIUM 100 MILLIGRAM(S): 100 TABLET, FILM COATED ORAL at 06:34

## 2021-06-02 RX ADMIN — Medication 1: at 11:56

## 2021-06-02 RX ADMIN — BRIMONIDINE TARTRATE 1 DROP(S): 2 SOLUTION/ DROPS OPHTHALMIC at 17:28

## 2021-06-02 RX ADMIN — INSULIN HUMAN 7 UNIT(S): 100 INJECTION, SOLUTION SUBCUTANEOUS at 01:47

## 2021-06-02 RX ADMIN — APIXABAN 5 MILLIGRAM(S): 2.5 TABLET, FILM COATED ORAL at 11:57

## 2021-06-02 NOTE — PROGRESS NOTE ADULT - PROBLEM SELECTOR PLAN 4
Hx of afib. currently NSR  - c/w home coreg 25mg BID   - restart eliquis in AM    #twave inversions: Patient with large twave inversions particularly in left precordial leads. Not isolated to anterior leads consistent with wellens and No current CP/SOB, SOLIS. Likely LVH with strain pattern vs possible apical HCM? No previous EKGs or echo to compare  - obtain collateral in AM  - f/up trops, ck, ckmb   - f/up echocardiogram Hx of afib. currently NSR  - c/w home coreg 25mg BID   - restart eliquis    #twave inversions: Patient with large twave inversions particularly in left precordial leads. Not isolated to anterior leads consistent with wellens and No current CP/SOB, SOLIS. Likely LVH with strain pattern vs possible apical HCM? No previous EKGs or echo to compare  - trop 0.45--> peaked at 0.42, but ck/ckmb wnl   - cards following, will get echo and start on asa 81   - f/u echocardiogram

## 2021-06-02 NOTE — PROGRESS NOTE ADULT - PROBLEM SELECTOR PLAN 5
Previously on insulin however d/tracey outpt. FSG on admission 300, AG 14  - med rec in AM  - f/up a1c  - c/w ISS  - continue to monitor

## 2021-06-02 NOTE — PROGRESS NOTE ADULT - PROBLEM SELECTOR PLAN 1
Pt with hx of recurrent left knee effusion 2/2 OA s/p drainage multiple times; most recently at [place] 2 days ago. Presents with worsening swelling, pain and difficulty walking since then.  LLE doppler neg for DVT. L Knee xray with Suprapatellar effusion appreciated. s/p arthrocentesis w/ WBC 38K, neutrophil <50%, unlikely septic, possible inflammatory process. No crystals seen. Effusion likely 2/2 OA, less likely gout/pseudogout, RA. In addition, with blood tinged fluid, RBC>60K; does not appear hemiarthrosis per ortho however pt on eliquis and may have contributed to quick re effusion? No hx of bleeding disorders. Pt with continued difficulty walking in ED; admitted for PT eval  - s/p arthrocentesis in ED   - f/up ortho recs  - consider outpt MRI for recurrent effusions   - PT eval in AM  - pain control  - restart eliquis tomm if knee exam stable Pt with hx of recurrent left knee effusion 2/2 OA s/p drainage multiple times; most recently at [place] 2 days ago. Presents with worsening swelling, pain and difficulty walking since then.   -Knee xray with Suprapatellar effusion appreciated. s/p arthrocentesis w/ WBC 38K, neutrophil <50%, unlikely septic, possible inflammatory process. No crystals seen. Effusion likely 2/2 OA, less likely gout/pseudogout, RA. In addition, with blood tinged fluid, RBC>60K; does not appear hemiarthrosis per ortho however pt on eliquis and may have contributed to quick re effusion? No hx of bleeding disorders. Pt with continued difficulty walking in ED; admitted for PT eval  - s/p arthrocentesis in ED   - f/up ortho recs  - consider outpt MRI for recurrent effusions   - PT eval in AM  - pain control  - restart Eliquis as knee exam is stable

## 2021-06-02 NOTE — CONSULT NOTE ADULT - SUBJECTIVE AND OBJECTIVE BOX
Patient is a 66y old  Male who presents with a chief complaint of      HPI:  A 65yo M with PMH of ESRD (TTS, last session today) via LUE fistula, T2DM, HTN, Afib? on Eliquis, L knee OA presents for recurrent Left knee effusion and pain. Patient says for the past two years he has had [id number] left knee effusion  2/2 OA s/p drainage multiple times; most recently at [place] 2 days ago. Since then pt has had increased swelling, pain and difficulty walking. He denies any fevers/chills, CP, SOB, abd pain, nausea/vomiting, diarhea. He has diffculty bearing weight on LLE 2/2 pain, and limited ROM.     In ED VS T98.9, HR 78, /65, RR 18 and SpO2 99% on RA. Labs s/f ESR 97, Hgb 9.1, Cr 4.47, , , . LLE doppler neg for DVT. L Knee xray with Suprapatellar effusion appreciated. Ortho consulted in ED s/p arthrocentesis; analysis not c/w septic joint. Pt with continued difficulty walking in ED; admitted for PT eval.  (01 Jun 2021 23:04)      PAST MEDICAL & SURGICAL HISTORY:  ESRD on dialysis    HTN (hypertension)    Type 2 diabetes mellitus    Left toe amputee        MEDICATIONS  (STANDING):  apixaban 5 milliGRAM(s) Oral every 12 hours  atorvastatin 20 milliGRAM(s) Oral at bedtime  brimonidine 0.2% Ophthalmic Solution 1 Drop(s) Both EYES two times a day  carvedilol 25 milliGRAM(s) Oral every 12 hours  glucagon  Injectable 1 milliGRAM(s) IntraMuscular once  hydrALAZINE 100 milliGRAM(s) Oral every 12 hours  insulin lispro (ADMELOG) corrective regimen sliding scale   SubCutaneous Before meals and at bedtime  ketotifen 0.025% Ophthalmic Solution 1 Drop(s) Both EYES at bedtime  losartan 100 milliGRAM(s) Oral every 24 hours  NIFEdipine XL 90 milliGRAM(s) Oral every 24 hours    MEDICATIONS  (PRN):  acetaminophen   Tablet .. 650 milliGRAM(s) Oral every 6 hours PRN Mild Pain (1 - 3)        FAMILY HISTORY:  FH: type 2 diabetes mellitus        CBC Full  -  ( 02 Jun 2021 06:38 )  WBC Count : 6.39 K/uL  RBC Count : 3.20 M/uL  Hemoglobin : 8.5 g/dL  Hematocrit : 26.9 %  Platelet Count - Automated : 273 K/uL  Mean Cell Volume : 84.1 fl  Mean Cell Hemoglobin : 26.6 pg  Mean Cell Hemoglobin Concentration : 31.6 gm/dL  Auto Neutrophil # : x  Auto Lymphocyte # : x  Auto Monocyte # : x  Auto Eosinophil # : x  Auto Basophil # : x  Auto Neutrophil % : x  Auto Lymphocyte % : x  Auto Monocyte % : x  Auto Eosinophil % : x  Auto Basophil % : x      06-02    134<L>  |  95<L>  |  39<H>  ----------------------------<  126<H>  4.5   |  26  |  5.99<H>    Ca    8.7      02 Jun 2021 06:38  Phos  4.5     06-02  Mg     2.1     06-02    TPro  8.0  /  Alb  3.7  /  TBili  0.4  /  DBili  x   /  AST  26  /  ALT  45  /  AlkPhos  234<H>  06-01            Radiology:    < from: Xray Knee 3 Views, Left (06.01.21 @ 15:57) >  EXAM:  XR KNEE 3 VIEWS LT                          PROCEDURE DATE:  06/01/2021          INTERPRETATION:  CLINICAL HISTORY: 66-year-old with pain and swelling.        IMPRESSION: Frontal, lateral and patellar views of the left knee demonstrate remodeling, sclerosis and erosive change with cortical destruction and focal osteopenia at the medial compartment of the knee joint with periarticular osteophytosis. Very large joint effusion. There is degenerative change of the patellofemoral joint.      Findings are consistent with chronic septic arthritis the medial compartment/osteomyelitis and MR correlation is advised.          Vital Signs Last 24 Hrs  T(C): 37.4 (02 Jun 2021 09:04), Max: 37.4 (02 Jun 2021 09:04)  T(F): 99.3 (02 Jun 2021 09:04), Max: 99.3 (02 Jun 2021 09:04)  HR: 71 (02 Jun 2021 09:04) (68 - 78)  BP: 144/69 (02 Jun 2021 09:04) (144/67 - 161/73)  BP(mean): --  RR: 18 (02 Jun 2021 09:04) (18 - 18)  SpO2: 97% (02 Jun 2021 09:04) (95% - 98%)        REVIEW OF SYSTEMS:    CONSTITUTIONAL: No fever, weight loss, or fatigue  EYES: No eye pain, visual disturbances, or discharge  ENMT:  No difficulty hearing, tinnitus, vertigo; No sinus or throat pain  NECK: No pain or stiffness  BREASTS: No pain, masses, or nipple discharge  RESPIRATORY: No cough, wheezing, chills or hemoptysis; No shortness of breath  CARDIOVASCULAR: No chest pain, palpitations, dizziness, or leg swelling  GASTROINTESTINAL: No abdominal or epigastric pain. No nausea, vomiting, or hematemesis; No diarrhea or constipation. No melena or hematochezia.  GENITOURINARY: No dysuria, frequency, hematuria, or incontinence  NEUROLOGICAL: No headaches, memory loss, loss of strength, numbness, or tremors  SKIN: No itching, burning, rashes, or lesions   LYMPH NODES: No enlarged glands  ENDOCRINE: No heat or cold intolerance; No hair loss  MUSCULOSKELETAL: Left knee pan and swelling  PSYCHIATRIC: No depression, anxiety, mood swings, or difficulty sleeping  HEME/LYMPH: No easy bruising, or bleeding gums  ALLERGY AND IMMUNOLOGIC: No hives or eczema  VASCULAR: no swelling, erythema,   : no dysuria, hematuria, frequency        Physical Exam:   WDWN 65 yo AA gentleman lying in semi Lovett's position, awake/alert, c/o left knee pain    Head: normocephalic, atraumatic    Eyes: PERRLA, EOMI, no nystagmus, sclera anicteric    ENT: nasal discharge, uvula midline, no oropharyngeal erythema/exudate    Neck: supple, negative JVD, negative carotid bruits, no thyromegaly    Chest: mild bibasilar crackles    Cardiovascular: regular rate and rhythm, neg murmurs/rubs/gallops    Abdomen: soft, non distended, non tender to palpation in all 4 quadrants, negative rebound/guarding, normal bowel sounds    Extremities:  LUE AVF with palpable thrill, trace LE edema    Musculoskeletal: L knee: + suprapatella effusion, flexion 0-75 deg, TTP medial/suprapatellar area    Gait:  not tested        PM&R Impression:    1) OA L knee with effusion/ s/p arthrocentesis  2) weight bearing as tolerated    Plan:    1) Physical therapy focusing on therapeutic exercises, bed mobility/transfer out of bed evaluation, progressive ambulation with assistive devices prn.    2) Anticipated Disposition Plan/Recs:   pending functional progress

## 2021-06-02 NOTE — PROGRESS NOTE ADULT - SUBJECTIVE AND OBJECTIVE BOX
OVERNIGHT EVENTS:  As per nurse and patient, no o/n events, patient resting comfortably.    INTERVAL HPI:   Patient was seen and examined at bedside. No complaints at this time. Patient denies: fever, chills, dizziness, weakness, HA, Changes in vision, CP, palpitations, SOB, cough, N/V/D/C, dysuria, changes in bowel movements, LE edema. ROS otherwise negative.    VITAL SIGNS:  T(F): 99 (06-02-21 @ 06:06)  HR: 76 (06-02-21 @ 06:06)  BP: 144/67 (06-02-21 @ 06:06)  RR: 18 (06-02-21 @ 06:06)  SpO2: 95% (06-02-21 @ 06:06)  Wt(kg): --    PHYSICAL EXAM:    Constitutional: WDWN, NAD  HEENT: PERRL, EOMI, sclera non-icteric, neck supple, trachea midline, no masses, no JVD, MMM, good dentition  Respiratory: CTA b/l, good air entry b/l, no wheezing, no rhonchi, no rales, without accessory muscle use and no intercostal retractions  Cardiovascular: RRR, normal S1S2, no M/R/G  Gastrointestinal: soft, NTND, no masses palpable, BS normal  Extremities: Warm, well perfused, pulses equal bilateral upper and lower extremities, no edema, no clubbing  Neurological: AAOx3, CN Grossly intact  Skin: Normal temperature, warm, dry    MEDICATIONS  (STANDING):  apixaban 5 milliGRAM(s) Oral every 12 hours  atorvastatin 20 milliGRAM(s) Oral at bedtime  brimonidine 0.2% Ophthalmic Solution 1 Drop(s) Both EYES two times a day  carvedilol 25 milliGRAM(s) Oral every 12 hours  glucagon  Injectable 1 milliGRAM(s) IntraMuscular once  hydrALAZINE 100 milliGRAM(s) Oral every 12 hours  insulin lispro (ADMELOG) corrective regimen sliding scale   SubCutaneous Before meals and at bedtime  ketotifen 0.025% Ophthalmic Solution 1 Drop(s) Both EYES at bedtime  losartan 100 milliGRAM(s) Oral every 24 hours  NIFEdipine XL 90 milliGRAM(s) Oral every 24 hours    MEDICATIONS  (PRN):  acetaminophen   Tablet .. 650 milliGRAM(s) Oral every 6 hours PRN Mild Pain (1 - 3)      Allergies    No Known Allergies    Intolerances        LABS:                        8.5    6.39  )-----------( 273      ( 02 Jun 2021 06:38 )             26.9     06-02    134<L>  |  95<L>  |  39<H>  ----------------------------<  126<H>  4.5   |  26  |  5.99<H>    Ca    8.7      02 Jun 2021 06:38  Phos  4.5     06-02  Mg     2.1     06-02    TPro  8.0  /  Alb  3.7  /  TBili  0.4  /  DBili  x   /  AST  26  /  ALT  45  /  AlkPhos  234<H>  06-01    PT/INR - ( 01 Jun 2021 14:16 )   PT: 15.6 sec;   INR: 1.32          PTT - ( 01 Jun 2021 14:16 )  PTT:32.9 sec        RADIOLOGY & ADDITIONAL TESTS:  Reviewed OVERNIGHT EVENTS:  As per nurse and patient, no o/n events, patient resting comfortably.    INTERVAL HPI:   Patient was seen and examined at bedside. He states he still has left knee pain, improved with rest and worsened by movement/walking. He uses a cane to walk at baseline. Patient denies: fever, chills, dizziness, weakness, HA, Changes in vision, CP, palpitations, SOB, cough, N/V/D/C, dysuria, changes in bowel movements.  ROS otherwise negative.    VITAL SIGNS:  T(F): 99 (06-02-21 @ 06:06)  HR: 76 (06-02-21 @ 06:06)  BP: 144/67 (06-02-21 @ 06:06)  RR: 18 (06-02-21 @ 06:06)  SpO2: 95% (06-02-21 @ 06:06)  Wt(kg): --    PHYSICAL EXAM:    Constitutional: WDWN, NAD  HEENT: PERRL, EOMI, sclera non-icteric, neck supple, trachea midline, no masses, no JVD, MMM,  Respiratory: CTA b/l, good air entry b/l, no wheezing, no rhonchi, no rales, without accessory muscle use and no intercostal retractions  Cardiovascular: irregular rhythm, regular rate, normal S1S2, no M/R/G  Gastrointestinal: soft, NTND, no masses palpable, BS normal  Extremities: left knee- edematous, with prepatellar effusion, limited range of motion, nontender to palpation. Warm, well perfused, pulses equal bilateral upper and lower extremities, no edema, no clubbing  Neurological: AAOx3, CN Grossly intact  Skin: Normal temperature, warm, dry    MEDICATIONS  (STANDING):  apixaban 5 milliGRAM(s) Oral every 12 hours  atorvastatin 20 milliGRAM(s) Oral at bedtime  brimonidine 0.2% Ophthalmic Solution 1 Drop(s) Both EYES two times a day  carvedilol 25 milliGRAM(s) Oral every 12 hours  glucagon  Injectable 1 milliGRAM(s) IntraMuscular once  hydrALAZINE 100 milliGRAM(s) Oral every 12 hours  insulin lispro (ADMELOG) corrective regimen sliding scale   SubCutaneous Before meals and at bedtime  ketotifen 0.025% Ophthalmic Solution 1 Drop(s) Both EYES at bedtime  losartan 100 milliGRAM(s) Oral every 24 hours  NIFEdipine XL 90 milliGRAM(s) Oral every 24 hours    MEDICATIONS  (PRN):  acetaminophen   Tablet .. 650 milliGRAM(s) Oral every 6 hours PRN Mild Pain (1 - 3)      Allergies    No Known Allergies    Intolerances        LABS:                        8.5    6.39  )-----------( 273      ( 02 Jun 2021 06:38 )             26.9     06-02    134<L>  |  95<L>  |  39<H>  ----------------------------<  126<H>  4.5   |  26  |  5.99<H>    Ca    8.7      02 Jun 2021 06:38  Phos  4.5     06-02  Mg     2.1     06-02    TPro  8.0  /  Alb  3.7  /  TBili  0.4  /  DBili  x   /  AST  26  /  ALT  45  /  AlkPhos  234<H>  06-01    PT/INR - ( 01 Jun 2021 14:16 )   PT: 15.6 sec;   INR: 1.32          PTT - ( 01 Jun 2021 14:16 )  PTT:32.9 sec        RADIOLOGY & ADDITIONAL TESTS:  Reviewed

## 2021-06-02 NOTE — PROGRESS NOTE ADULT - ASSESSMENT
A 67yo M with PMH of ESRD (TTS, last session today) via LUE fistula, T2DM, HTN, Afib? on Eliquis, L knee OA presents for recurrent Left knee effusion and pain.    A 65yo M with PMH of ESRD (TTS, last session today) via LUE fistula, T2DM, HTN, Afib? on Eliquis, L knee OA presents for recurrent Left knee effusion and pain, likely 2/2 known erosive osteoarthritis with WBC<50,000 on synovial analysis and pt afebrile- but also with significant RBCs, ?if Eliquis use contributing.

## 2021-06-02 NOTE — PROGRESS NOTE ADULT - PROBLEM SELECTOR PLAN 2
ESRD (TTS, last session today) via LUE fistula however being prepped for peritoneal dialysis. euvolemic currently  - [place]ro [place] ([place], last session today) via LUE fistula however being prepped for peritoneal dialysis. euvolemic currently  - nephro consulted for HD

## 2021-06-02 NOTE — PROGRESS NOTE ADULT - PROBLEM SELECTOR PLAN 6
hgb 9.1 on admission likely 2/2 ESRD; no previous to compare.   - continue to monitor   - f/up iron studies  - active T/S

## 2021-06-02 NOTE — CONSULT NOTE ADULT - SUBJECTIVE AND OBJECTIVE BOX
66M PMH HTN, DM, ?AF on eliquis, ESRD (TTS, LUE fistula), L knee OA p/w knee pain and effusion. Incidentally found with abnormal ekg. EKG nsr, rsr v1, apcs with aberrant conduction, q waves I/avL/II/III/avF, diffuse deep TWI. Patient denies current or recent CP/SOB. Denies hx MI, PACO, angiogram. Baseline ET 1/4 block limited by severe arthritis without anginal equivalents. Prior to arthritis (several years ago) had ET of 10+ blocks. Pt has been told he had an abnormal TTE several years ago but does not see a cardiologist. No FH of sudden cardiac death or HCM.      HPI:  A 65yo M with PMH of ESRD (TTS, last session today) via LUE fistula, T2DM, HTN, Afib? on Eliquis, L knee OA presents for recurrent Left knee effusion and pain. Patient says for the past two years he has had [id number] left knee effusion  2/2 OA s/p drainage multiple times; most recently at [place] 2 days ago. Since then pt has had increased swelling, pain and difficulty walking. He denies any fevers/chills, CP, SOB, abd pain, nausea/vomiting, diarhea. He has diffculty bearing weight on LLE 2/2 pain, and limited ROM.     In ED VS T98.9, HR 78, /65, RR 18 and SpO2 99% on RA. Labs s/f ESR 97, Hgb 9.1, Cr 4.47, , , . LLE doppler neg for DVT. L Knee xray with Suprapatellar effusion appreciated. Ortho consulted in ED s/p arthrocentesis; analysis not c/w septic joint. Pt with continued difficulty walking in ED; admitted for PT eval.  (01 Jun 2021 23:04)    PAST MEDICAL & SURGICAL HISTORY:  ESRD on dialysis    HTN (hypertension)    Type 2 diabetes mellitus    Left toe amputee        FAMILY HISTORY:  FH: type 2 diabetes mellitus        ALLERGIES/INTOLERANCES:  No Known Allergies    HOME MEDICATIONS:    INPATIENT MEDICATIONS:  carvedilol 25 milliGRAM(s) Oral every 12 hours  hydrALAZINE 100 milliGRAM(s) Oral every 12 hours  losartan 100 milliGRAM(s) Oral every 24 hours  NIFEdipine XL 90 milliGRAM(s) Oral every 24 hours    apixaban 5 milliGRAM(s) Oral every 12 hours    acetaminophen   Tablet .. 650 milliGRAM(s) Oral every 6 hours PRN  atorvastatin 20 milliGRAM(s) Oral at bedtime  brimonidine 0.2% Ophthalmic Solution 1 Drop(s) Both EYES two times a day  glucagon  Injectable 1 milliGRAM(s) IntraMuscular once  insulin lispro (ADMELOG) corrective regimen sliding scale   SubCutaneous Before meals and at bedtime  ketotifen 0.025% Ophthalmic Solution 1 Drop(s) Both EYES at bedtime      REVIEW OF SYSTEMS:    CONSTITUTIONAL: No weakness, F/C, wt loss/gain  EYES: No visual changes/disturbances  ENMT: No dry mouth, no vertigo  NECK: No pain or stiffness  RESPIRATORY: No cough, wheezing, hemoptysis; No shortness of breath  CARDIOVASCULAR: No chest pain, palpitations, lightheadedness/dizziness, LOC, or leg swelling  GASTROINTESTINAL: No abdominal or epigastric pain. No N/V/D/C. No melena, hematochezia, or hematemesis.  GENITOURINARY: No dysuria, increased frequency, hematuria, or incontinence  NEUROLOGICAL: No lightheadedness/dizziness, LOC, headaches, numbness or weakness  MUSCULOSKELETAL: No joint pain or swelling; No muscle, back, or extremity pain  SKIN: No itching, burning, rashes, or lesions   ENDOCRINE: No heat or cold intolerance; No hair loss  HEME/LYMPH: No easy bruising, or bleeding gums  PSYCHIATRIC: No depression, anxiety, mood swings, or difficulty sleeping    [ ] All other review of systems are negative unless indicated above.  [ ] Unable to obtain due to:    PHYSICAL EXAM:    T(C): 37.2 (06-02-21 @ 06:06), Max: 37.2 (06-01-21 @ 13:13)  HR: 76 (06-02-21 @ 06:06) (68 - 78)  BP: 144/67 (06-02-21 @ 06:06) (134/65 - 161/73)  RR: 18 (06-02-21 @ 06:06) (18 - 18)  SpO2: 95% (06-02-21 @ 06:06) (95% - 99%)  Wt(kg): --    NAD  RRR 3/6 systolic murmur  Trace crackles R>L  1+ LE edema  JVD+      	  LABS:                        8.5    6.39  )-----------( 273      ( 02 Jun 2021 06:38 )             26.9     06-01    132<L>  |  92<L>  |  25<H>  ----------------------------<  325<H>  4.4   |  26  |  4.47<H>    Ca    9.2      01 Jun 2021 14:16  Phos  2.7     06-01  Mg     2.1     06-01    TPro  8.0  /  Alb  3.7  /  TBili  0.4  /  DBili  x   /  AST  26  /  ALT  45  /  AlkPhos  234<H>  06-01      ASSESSMENT/PLAN: 	  66M PMH HTN, DM, ?AF on eliquis, ESRD (TTS, LUE fistula), L knee OA p/w knee pain and effusion, incidentally found with abnormal ekg and elevated troponin 0.45 without anginal equivalents. EKG nsr, rsr v1, apcs with aberrant conduction, q waves I/avL/II/III/avF, diffuse deep TWI.     #Abnormal ekg: diffuse deep TWI with inferior/lateral Q waves and troponin elevation 0.45 (in setting of esrd) with normal CK/CKMB and no anginal equivalents. DDx includes HCM, LVH, ischemia. Electrolytes are normal. Not c/w significant intracranial pathology.  - Obtain TTE  - start aspirin 81 if no contraindication  - Continue to trend cardiac enzymes  - check lipid panel  - will not recommend ACS protocol at this point, however would reevaluate if there is a change in clinical status, wall motion abnormalities on TTE, or significant uptrend in troponin    Preliminary evaluation by on call cardiology fellow; final recommendations pending attending attestation  Dayo Horn MD PGY4

## 2021-06-03 ENCOUNTER — TRANSCRIPTION ENCOUNTER (OUTPATIENT)
Age: 66
End: 2021-06-03

## 2021-06-03 DIAGNOSIS — E11.9 TYPE 2 DIABETES MELLITUS WITHOUT COMPLICATIONS: ICD-10-CM

## 2021-06-03 DIAGNOSIS — N18.9 CHRONIC KIDNEY DISEASE, UNSPECIFIED: ICD-10-CM

## 2021-06-03 DIAGNOSIS — I10 ESSENTIAL (PRIMARY) HYPERTENSION: ICD-10-CM

## 2021-06-03 DIAGNOSIS — R77.8 OTHER SPECIFIED ABNORMALITIES OF PLASMA PROTEINS: ICD-10-CM

## 2021-06-03 DIAGNOSIS — I48.20 CHRONIC ATRIAL FIBRILLATION, UNSPECIFIED: ICD-10-CM

## 2021-06-03 LAB
ANION GAP SERPL CALC-SCNC: 15 MMOL/L — SIGNIFICANT CHANGE UP (ref 5–17)
BUN SERPL-MCNC: 60 MG/DL — HIGH (ref 7–23)
CALCIUM SERPL-MCNC: 8.5 MG/DL — SIGNIFICANT CHANGE UP (ref 8.4–10.5)
CHLORIDE SERPL-SCNC: 95 MMOL/L — LOW (ref 96–108)
CHOLEST SERPL-MCNC: 77 MG/DL — SIGNIFICANT CHANGE UP
CO2 SERPL-SCNC: 23 MMOL/L — SIGNIFICANT CHANGE UP (ref 22–31)
CREAT SERPL-MCNC: 7.36 MG/DL — HIGH (ref 0.5–1.3)
GLUCOSE BLDC GLUCOMTR-MCNC: 185 MG/DL — HIGH (ref 70–99)
GLUCOSE BLDC GLUCOMTR-MCNC: 204 MG/DL — HIGH (ref 70–99)
GLUCOSE BLDC GLUCOMTR-MCNC: 253 MG/DL — HIGH (ref 70–99)
GLUCOSE SERPL-MCNC: 240 MG/DL — HIGH (ref 70–99)
HCT VFR BLD CALC: 25.3 % — LOW (ref 39–50)
HDLC SERPL-MCNC: 38 MG/DL — LOW
HGB BLD-MCNC: 8.1 G/DL — LOW (ref 13–17)
LIPID PNL WITH DIRECT LDL SERPL: 29 MG/DL — SIGNIFICANT CHANGE UP
MAGNESIUM SERPL-MCNC: 2.1 MG/DL — SIGNIFICANT CHANGE UP (ref 1.6–2.6)
MCHC RBC-ENTMCNC: 26.8 PG — LOW (ref 27–34)
MCHC RBC-ENTMCNC: 32 GM/DL — SIGNIFICANT CHANGE UP (ref 32–36)
MCV RBC AUTO: 83.8 FL — SIGNIFICANT CHANGE UP (ref 80–100)
NON HDL CHOLESTEROL: 39 MG/DL — SIGNIFICANT CHANGE UP
NRBC # BLD: 0 /100 WBCS — SIGNIFICANT CHANGE UP (ref 0–0)
PHOSPHATE SERPL-MCNC: 4.8 MG/DL — HIGH (ref 2.5–4.5)
PLATELET # BLD AUTO: 259 K/UL — SIGNIFICANT CHANGE UP (ref 150–400)
POTASSIUM SERPL-MCNC: 5.4 MMOL/L — HIGH (ref 3.5–5.3)
POTASSIUM SERPL-SCNC: 5.4 MMOL/L — HIGH (ref 3.5–5.3)
RBC # BLD: 3.02 M/UL — LOW (ref 4.2–5.8)
RBC # FLD: 15.9 % — HIGH (ref 10.3–14.5)
SODIUM SERPL-SCNC: 133 MMOL/L — LOW (ref 135–145)
TRIGL SERPL-MCNC: 49 MG/DL — SIGNIFICANT CHANGE UP
WBC # BLD: 6.9 K/UL — SIGNIFICANT CHANGE UP (ref 3.8–10.5)
WBC # FLD AUTO: 6.9 K/UL — SIGNIFICANT CHANGE UP (ref 3.8–10.5)

## 2021-06-03 PROCEDURE — 99222 1ST HOSP IP/OBS MODERATE 55: CPT

## 2021-06-03 PROCEDURE — 99233 SBSQ HOSP IP/OBS HIGH 50: CPT | Mod: GC

## 2021-06-03 PROCEDURE — 99222 1ST HOSP IP/OBS MODERATE 55: CPT | Mod: GC

## 2021-06-03 PROCEDURE — 99233 SBSQ HOSP IP/OBS HIGH 50: CPT

## 2021-06-03 RX ORDER — INSULIN LISPRO 100/ML
3 VIAL (ML) SUBCUTANEOUS
Refills: 0 | Status: DISCONTINUED | OUTPATIENT
Start: 2021-06-03 | End: 2021-06-03

## 2021-06-03 RX ORDER — ERYTHROPOIETIN 10000 [IU]/ML
8000 INJECTION, SOLUTION INTRAVENOUS; SUBCUTANEOUS ONCE
Refills: 0 | Status: COMPLETED | OUTPATIENT
Start: 2021-06-03 | End: 2021-06-03

## 2021-06-03 RX ORDER — INSULIN LISPRO 100/ML
VIAL (ML) SUBCUTANEOUS
Refills: 0 | Status: DISCONTINUED | OUTPATIENT
Start: 2021-06-03 | End: 2021-06-05

## 2021-06-03 RX ORDER — CHLORHEXIDINE GLUCONATE 213 G/1000ML
1 SOLUTION TOPICAL ONCE
Refills: 0 | Status: COMPLETED | OUTPATIENT
Start: 2021-06-03 | End: 2021-06-03

## 2021-06-03 RX ORDER — HYDRALAZINE HCL 50 MG
100 TABLET ORAL EVERY 8 HOURS
Refills: 0 | Status: DISCONTINUED | OUTPATIENT
Start: 2021-06-03 | End: 2021-06-05

## 2021-06-03 RX ORDER — TRAMADOL HYDROCHLORIDE 50 MG/1
25 TABLET ORAL ONCE
Refills: 0 | Status: DISCONTINUED | OUTPATIENT
Start: 2021-06-03 | End: 2021-06-03

## 2021-06-03 RX ORDER — INSULIN GLARGINE 100 [IU]/ML
15 INJECTION, SOLUTION SUBCUTANEOUS AT BEDTIME
Refills: 0 | Status: DISCONTINUED | OUTPATIENT
Start: 2021-06-03 | End: 2021-06-03

## 2021-06-03 RX ORDER — INSULIN GLARGINE 100 [IU]/ML
10 INJECTION, SOLUTION SUBCUTANEOUS AT BEDTIME
Refills: 0 | Status: DISCONTINUED | OUTPATIENT
Start: 2021-06-03 | End: 2021-06-04

## 2021-06-03 RX ORDER — INSULIN LISPRO 100/ML
5 VIAL (ML) SUBCUTANEOUS
Refills: 0 | Status: DISCONTINUED | OUTPATIENT
Start: 2021-06-03 | End: 2021-06-05

## 2021-06-03 RX ADMIN — BRIMONIDINE TARTRATE 1 DROP(S): 2 SOLUTION/ DROPS OPHTHALMIC at 06:06

## 2021-06-03 RX ADMIN — CARVEDILOL PHOSPHATE 25 MILLIGRAM(S): 80 CAPSULE, EXTENDED RELEASE ORAL at 21:48

## 2021-06-03 RX ADMIN — ATORVASTATIN CALCIUM 20 MILLIGRAM(S): 80 TABLET, FILM COATED ORAL at 21:48

## 2021-06-03 RX ADMIN — Medication 90 MILLIGRAM(S): at 23:06

## 2021-06-03 RX ADMIN — INSULIN GLARGINE 10 UNIT(S): 100 INJECTION, SOLUTION SUBCUTANEOUS at 21:59

## 2021-06-03 RX ADMIN — CARVEDILOL PHOSPHATE 25 MILLIGRAM(S): 80 CAPSULE, EXTENDED RELEASE ORAL at 09:34

## 2021-06-03 RX ADMIN — CHLORHEXIDINE GLUCONATE 1 APPLICATION(S): 213 SOLUTION TOPICAL at 16:10

## 2021-06-03 RX ADMIN — BRIMONIDINE TARTRATE 1 DROP(S): 2 SOLUTION/ DROPS OPHTHALMIC at 17:20

## 2021-06-03 RX ADMIN — APIXABAN 5 MILLIGRAM(S): 2.5 TABLET, FILM COATED ORAL at 11:27

## 2021-06-03 RX ADMIN — Medication 1: at 21:58

## 2021-06-03 RX ADMIN — LOSARTAN POTASSIUM 100 MILLIGRAM(S): 100 TABLET, FILM COATED ORAL at 06:06

## 2021-06-03 RX ADMIN — TRAMADOL HYDROCHLORIDE 25 MILLIGRAM(S): 50 TABLET ORAL at 22:54

## 2021-06-03 RX ADMIN — Medication 5 UNIT(S): at 17:20

## 2021-06-03 RX ADMIN — TRAMADOL HYDROCHLORIDE 25 MILLIGRAM(S): 50 TABLET ORAL at 23:50

## 2021-06-03 RX ADMIN — Medication 100 MILLIGRAM(S): at 21:48

## 2021-06-03 RX ADMIN — KETOTIFEN FUMARATE 1 DROP(S): 0.34 SOLUTION OPHTHALMIC at 21:49

## 2021-06-03 RX ADMIN — Medication 100 MILLIGRAM(S): at 16:10

## 2021-06-03 RX ADMIN — ERYTHROPOIETIN 8000 UNIT(S): 10000 INJECTION, SOLUTION INTRAVENOUS; SUBCUTANEOUS at 12:26

## 2021-06-03 RX ADMIN — Medication 2: at 17:20

## 2021-06-03 RX ADMIN — Medication 6: at 08:45

## 2021-06-03 RX ADMIN — APIXABAN 5 MILLIGRAM(S): 2.5 TABLET, FILM COATED ORAL at 23:06

## 2021-06-03 NOTE — PHYSICAL THERAPY INITIAL EVALUATION ADULT - GAIT DEVIATIONS NOTED, PT EVAL
unsteady, antalgic gait, attempt to reach for external support with other hand during ambulation, decreased heel strike and knee flexion on LLE during ambulation./decreased cynthia/increased time in double stance/decreased step length

## 2021-06-03 NOTE — PROGRESS NOTE ADULT - PROBLEM SELECTOR PLAN 3
HbA1c 7.7%; Pt. reportedly on insulin in the past; cont. basal-bolus insulin for now, diabetic diet, statin; monitor blood glucose; clarify home rx regimen

## 2021-06-03 NOTE — DISCHARGE NOTE PROVIDER - CARE PROVIDER_API CALL
Wolf Santos)  Orthopaedic Surgery Surgery  [address] [place] [address]  Phone: [phone]  Fax: [phone]  Follow Up Time: 1 week

## 2021-06-03 NOTE — PROGRESS NOTE ADULT - ASSESSMENT
ASSESSMENT:       PLAN: ASSESSMENT:   66M PMH HTN, DM, AFib on eliquis, ESRD on HD, L knee OA presented with knee pain and effusion. Cardiology consulted for abnormal EKG in setting of trop 0.45. Initial EKG with lead misplacement with repeat demonstrating NSR with LVH and repolarization.     PLAN:  Abnormal EKG  Repeat EKG without evidence of ischemic changes, with early repolarization in setting of LVH. Trop 0.45-> 0.42. No active chest pain.  TTE (6/2/21): Normal left ventricular size and systolic function,  Normal right ventricular size and systolic function, Severe biatrial enlargement, Mild-to-moderate aortic stenosis. The peak transvalvular velocity is 2.90 m/s, the mean transvalvular gradient is 20.00 mmHg, and the LVOT/AV velocity ratio is 0.43. Trace aortic regurgitation, Moderate mitral regurgitation,  Moderate tricuspid regurgitation, Pulmonary hypertension present, pulmonary artery systolic pressure is 50mmHg, No pericardial effusion, Left pleural effusion  - No further cardiac work up necessary  - Have patient follow up with his cardiologist for further management    HTN  Hx of HTN with home regimen: clonidine 0.3 patch, hydralazine 100mg TID, imdur 30mg qD, torsemide 100mg, Coreg 25 BID, losartan 100mg, nifedipine 90mg  - Continue with current regimen: Hydralazine 100mg TID, coreg 25 BID, nifedipine 90mg, Losartan 100mg  - Hold off initiation of further antihypertensive regimen    Discussed with Dr. Mcallister.  - Have patient follow up with cardiologist for further management

## 2021-06-03 NOTE — PROGRESS NOTE ADULT - PROBLEM SELECTOR PLAN 1
chronic/recurrent, d/t OA; s/p arthrocentesis by Ortho; cont. Tylenol PRN, RICE, f/u PT recs; Pt. reports being told he would benefit from a knee replacement in the past, will help facilitate outpatient Ortho f/u

## 2021-06-03 NOTE — CONSULT NOTE ADULT - ASSESSMENT
per Internal Medicine    65 yo M with PMH of ESRD (TTS, last session today) via LUE fistula, T2DM, HTN, Afib? on Eliquis, L knee OA presents for recurrent Left knee effusion and pain, likely 2/2 known erosive osteoarthritis with WBC<50,000 on synovial analysis and pt afebrile- but also with significant RBCs, ?if Eliquis use contributing.        Problem/Plan - 1:  ·  Problem: Knee effusion, left.  Plan: Pt with hx of recurrent left knee effusion 2/2 OA s/p drainage multiple times; most recently at [place] 2 days ago. Presents with worsening swelling, pain and difficulty walking since then.   -Knee xray with Suprapatellar effusion appreciated. s/p arthrocentesis w/ WBC 38K, neutrophil <50%, unlikely septic, possible inflammatory process. No crystals seen. Effusion likely 2/2 OA, less likely gout/pseudogout, RA. In addition, with blood tinged fluid, RBC>60K; does not appear hemiarthrosis per ortho however pt on eliquis and may have contributed to quick re effusion? No hx of bleeding disorders. Pt with continued difficulty walking in ED; admitted for PT eval  - s/p arthrocentesis in ED   - f/up ortho recs  - consider outpt MRI for recurrent effusions   - PT eval in AM  - pain control  - restart Eliquis as knee exam is stable.     Problem/Plan - 2:  ·  Problem: ESRD (end stage renal disease) on dialysis.  Plan: ESRD (TTS, last session today) via LUE fistula however being prepped for peritoneal dialysis. euvolemic currently  - nephro consulted for HD.     Problem/Plan - 3:  ·  Problem: HTN (hypertension).  Plan: Hx of HTN; unable to recall any meds  - med rec in AM [facility]/ [facility]   - based on MAR will start hydral 100mg BID, Losartan 100mg and Nifedipine 90mg QD.     Problem/Plan - 4:  ·  Problem: Afib.  Plan: Hx of afib. currently NSR  - c/w home coreg 25mg BID   - restart eliquis    #twave inversions: Patient with large twave inversions particularly in left precordial leads. Not isolated to anterior leads consistent with wellens and No current CP/SOB, SOLIS. Likely LVH with strain pattern vs possible apical HCM? No previous EKGs or echo to compare  - trop 0.45--> peaked at 0.42, but ck/ckmb wnl   - cards following, will get echo and start on asa 81   - f/u echocardiogram.     Problem/Plan - 5:  ·  Problem: Diabetes.  Plan: Previously on insulin however d/tracey outpt. FSG on admission 300, AG 14  - med rec in AM  - f/up a1c  - c/w ISS  - continue to monitor.     Problem/Plan - 6:  Problem: Anemia. Plan: hgb 9.1 on admission likely 2/2 ESRD; no previous to compare.   - continue to monitor   - f/up iron studies  - active T/S.    Problem/Plan - 7:  ·  Problem: Nutrition, metabolism, and development symptoms.  Plan: F: none   E: replete prn   N: Renal, CC.     Problem/Plan - 8:  ·  Problem: Need for prophylactic measure.  Plan: DVT: Eliquis, SCD  GI: none     FULL CODE. 
66M PMHx ESRD (TTS via LUE fistula), T2DM, HTN, Afib? on Eliquis, L knee OA presents for recurrent Left knee effusion and pain. Nephrology consulted for hemodialysis.     Assessment/Plan:     #ESRD on HD MWF @[facility] [place]   usual Rx 4h 4L  last hemodialysis 6/1 per schedule   next hemodialysis today 6/3 per patient schedule   electrolytes noted   volume status noted   dry weight unknown     #anemia  Hb ~8   recommend IV Iron 200mg x5d  EPO 8k u TIW w/HD   transfusion as per primary team     #renal bone disease   acceptable   no indication for Hectorol at this time     Thank you for the opportunity to participate in the care of your patient. The nephrology service remains available to assist with any questions or concerns. Please feel free to reach us by paging the on-call nephrology fellow for urgent issues or as below.     Riccardo Edge M.D.   PGY-4, Nephrology Fellow   C: [phone]   P: [phone] 
A/P:66y Male    1.  DM  - A1c 7.7%  - Home med: Jardiance 5mg  - Management is challenging d/t patient's ESRD, will likely need to be d/tracey on his home medications  - Pt instructed to eat smaller portion of carbs at home  - Please continue lantus 10 units at night  - Please increase lispro to 5 units before each meal.    - Please change lispro from moderate to low dose sliding scale four times daily with meals and at bedtime    Plan discussed with attending, Dr. Lopez, and relayed to the primary team.

## 2021-06-03 NOTE — DISCHARGE NOTE PROVIDER - NSDCCPCAREPLAN_GEN_ALL_CORE_FT
PRINCIPAL DISCHARGE DIAGNOSIS  Diagnosis: Knee effusion, left  Assessment and Plan of Treatment:       SECONDARY DISCHARGE DIAGNOSES  Diagnosis: ESRD (end stage renal disease) on dialysis  Assessment and Plan of Treatment: ESRD (end stage renal disease) on dialysis    Diagnosis: HTN (hypertension)  Assessment and Plan of Treatment:     Diagnosis: Gait instability  Assessment and Plan of Treatment:      PRINCIPAL DISCHARGE DIAGNOSIS  Diagnosis: Knee effusion, left  Assessment and Plan of Treatment: A swollen knee joint can be caused by arthirits and the fluid in your knee is caused by arthrits of the knee.  The fluid in your knee was drained and yur knee does not have an infection. This should be further evaluated by the orthopedic surgeon, the number is provided, as they may be able to help relieve the pain and swelling of your knee with surgery, such as knee replacement. In the meantime,Rest your knee. Avoid activities that make the swelling or pain worse. Crutches, a cane, or a walker can be used to avoid putting weight on your knee while it heals.Apply ice to your knee to help relieve pain and swelling.Compress your knee with a brace or bandage to help reduce swelling. Use a brace or bandage only as directed.Elevate your knee above the level of your heart as often as you can. This will help decrease swelling and pain. Prop your joint on pillows or blankets to keep it elevated comfortably.  Apply heat to your knee to relieve pain. Apply heat for 20 to 30 minutes.Go to physical therapy.  When should I seek immediate care?Your knee locks or gives way and you fall.Your feet or toes start to look pale or feel cold.You cannot bear weight on your leg, or you have severe pain even after treatment. contact my healthcare provider if: You have a fever.You have redness or warmth over your knee.The swelling does not decrease with treatment.It gets harder or more painful to straighten your leg at the knee.Your knee weakens, or you continue to limp.  Please take tramadol as needed for pain as prescribed. Please follow up with your primary care doctor and the orthopedic doctor.      SECONDARY DISCHARGE DIAGNOSES  Diagnosis: ESRD (end stage renal disease) on dialysis  Assessment and Plan of Treatment: ESRD is a longstanding disease of the kidneys leading to renal failure. The kidneys filter waste and excess fluid from the blood. As kidneys fail, waste builds up. Symptoms develop slowly and aren't specific to the disease. Some people have no symptoms at all and are diagnosed by a lab test. Medications help manage symptoms. In later stages, filtering the blood with a machine (dialysis) or a transplant may be needed. Please continue to get dialysis on your scheduled days and follow up with a nephrologist and your PCP in 101-4 days.      Diagnosis: HTN (hypertension)  Assessment and Plan of Treatment: You have a known history of high blood pressure prior to your admission. To manage this you are on a medication called nifidipine, losartan, and hydralazine, and carvedilol. Continue these medications as prescribed. Please hold off on taking your Imdur and torsemide until you see your primary care doctor.. High blood pressure can cause damage to your heart and kidneys and increases your risk of heart attack and stroke. To avoid this, It is important that you continue to take this medication when you are discharged so that you can continue to control your blood pressure. Additionally be sure to follow up with your primary care physician on a regular basis to make sure your blood pressure continues to be well controlled. If you experience symptoms such as but not limited to: sudden onset blurry vision, nausea, vomiting, chest pain, shortness of breath, or palpitations, please go to the nearest emergency room.      Diagnosis: Diabetes  Assessment and Plan of Treatment: You have a diagnosis of type 2 diabetes (sometimes called type 2 "diabetes mellitus"). This is a disorder that disrupts the way your body uses sugar. All the cells in your body need sugar to work normally. Sugar gets into the cells with the help of a hormone called insulin. If there is not enough insulin, or if the body stops responding to insulin, sugar builds up in the blood. That is what happens to people with diabetes. Type 2 diabetes usually causes no symptoms. When symptoms do occur, they include the need to urinate often, intense thirst and blurry vision. Even though type 2 diabetes might not make you feel sick, it can cause serious problems over time, if it is not treated. The disorder can lead to heart attacks, strokes, kidney disease, vision problems (or even blindness), pain or loss of feeling in the hands and feet, and the need to have fingers, toes, or other body parts removed (amputated). In addition to maintaining an active lifestyle, losing weight, eating right, and not smoking it is important to take your diabetes medications as directed to control your blood sugar and prevent the possible complications from this disease.      Diagnosis: Osteoarthritis  Assessment and Plan of Treatment: Osteoarthritis (OA) occurs when cartilage (tissue that cushions a joint) wears away slowly and causes the bones to rub together. OA is a long-term condition that often affects the hands, neck, lower back, knees, and hips. OA is also called arthrosis or degenerative joint disease.   Call your doctor or specialist if:  You have severe pain.  You cannot move your joint.  You have a fever.  Your joint is red and tender.  Manage your symptoms:  Stay active. Physical activity may reduce your pain and improve your ability to do daily activities. Avoid activities that cause pain. Ask your healthcare provider what type of exercise would be best for you.  Maintain a healthy weight. This helps decrease the strain on the joints in your back, hips, knees, ankles, and feet. Ask your healthcare provider what a healthy weight is for you. He or she can help you create a weight loss plan if you are overweight.  Use heat or ice on your joints as directed. Heat and ice help decrease pain, swelling, and muscle spasms. For heat, use a heating pad on a low setting for 20 minutes, or take a warm bath. For ice, use an ice pack, or put crushed ice in a plastic bag. Cover it with a towel before you place it on your joint. Use ice for 15 minutes every hour.  Massage the muscles around the joint. Massage helps relieve pain and stiffness. Your healthcare provider or a physical therapist can show you how to do this. If you have hip OA, another person may need to help you massage the area.  Use a cane, crutches, or a walker if directed. These help protect and relieve pressure on your ankle, knee, and hip joints. You may also be prescribed shoe inserts to decrease pressure in your joints.  Wear flat or low-heeled shoes. This will help decrease pain and reduce pressure on your ankle, knee, and hip joints.    Diagnosis: Anemia  Assessment and Plan of Treatment: Anemia is the medical term for when a person has too few red blood cells. Red blood cells are the cells in your blood that carry oxygen. If you have too few red blood cells, your body does not get all the oxygen it needs. Most people with anemia have no symptoms. They find out they have it after their doctor does blood tests for another reason. People who do have symptoms might feel tired or weak, especially if they try to exercise or have headaches. If you experience these symptoms you should see your primary care provider for further evaluation. Ask your doctor about role for IV iron given your kidney disease.       Diagnosis: Chronic atrial fibrillation  Assessment and Plan of Treatment: You have a known diagnosis of atrial fibrillation prior to your admission. This condition, if not treated, increases your risk of stroke or heart attack. Please continue to take eliquis and carvedilol as directed. Please make sure to continue taking these medications to avoid developing blood clots and to lower your risk of stroke. Additionally be sure to follow up with your primary care physician (and/or cardiologist) on a regular basis to ensure that this condition does not require changes to the dose or type of medication.

## 2021-06-03 NOTE — PROGRESS NOTE ADULT - PROBLEM SELECTOR PLAN 3
Hx of HTN; unable to recall any meds  - med rec in AM w/ [facility]   - based on MAR will start hydral 100mg BID, Losartan 100mg and Nifedipine 90mg QD. Hx of HTN; unable to recall any meds  - med rec in AM w/ [facility]   - based on MAR will continue hydral 100mg TID, Losartan 100mg and Nifedipine 90mg QD.  - also on torsemide 100mg daily, and imdur 30mg daily, per med rec. will hold for now pending further discussion with PCP/cardiologist

## 2021-06-03 NOTE — PROGRESS NOTE ADULT - PROBLEM SELECTOR PLAN 2
ESRD (TTS, last session today) via LUE fistula however being prepped for peritoneal dialysis. euvolemic currently  - nephro consulted for HD ESRD (TTS, last session today) via LUE fistula however being prepped for peritoneal dialysis. euvolemic currently  - nephro consulted for HD, HD today

## 2021-06-03 NOTE — DISCHARGE NOTE PROVIDER - NSDCFUADDAPPT_GEN_ALL_CORE_FT
You have an appointment made with your Primary Care Doctor, Dr. Davian Meza on 6/21 at 2:45PM.   Phone: [phone]

## 2021-06-03 NOTE — DISCHARGE NOTE PROVIDER - HOSPITAL COURSE
#Discharge: do not delete    67yo M with PMH of ESRD (TTS, last session today) via LUE fistula, T2DM, HTN, Afib? on Eliquis, L knee OA presents for recurrent Left knee effusion and pain, likely 2/2 known erosive osteoarthritis with WBC<50,000 on synovial analysis and pt afebrile- but also with significant RBCs, ?if Eliquis use contributing.        Problem List/Main Diagnoses (system-based):     Knee effusion, left.  Plan: Pt with hx of recurrent left knee effusion 2/2 OA s/p drainage multiple times; most recently at [place] 2 days ago. Presents with worsening swelling, pain and difficulty walking since then.   -Knee xray with Suprapatellar effusion appreciated. s/p arthrocentesis w/ WBC 38K, neutrophil <50%, unlikely septic, possible inflammatory process. No crystals seen. Effusion likely 2/2 OA, less likely gout/pseudogout, RA. In addition, with blood tinged fluid, RBC>60K; does not appear hemiarthrosis per ortho however pt on eliquis and may have contributed to quick re effusion? No hx of bleeding disorders. Pt with continued difficulty walking in ED; admitted for PT eval  - s/p arthrocentesis in ED   - f/up ortho recs  - consider outpt MRI for recurrent effusions   - PT eval pending   - pain control     Problem/Plan - 2:  ·  Problem: ESRD (end stage renal disease) on dialysis.  Plan: ESRD (TTS, last session today) via LUE fistula however being prepped for peritoneal dialysis. euvolemic currently  - nephro consulted for HD, HD today.     Problem/Plan - 3:  ·  Problem: HTN (hypertension).  Plan: Hx of HTN; unable to recall any meds  - med rec in AM w/ [facility]   - based on MAR will continue hydral 100mg TID, Losartan 100mg and Nifedipine 90mg QD.  - also on torsemide 100mg daily, and imdur 30mg daily, per med rec. will hold for now pending further discussion with PCP/cardiologist.    #Afib.  Plan: Hx of afib. currently NSR  - c/w home coreg 25mg BID   - c/w eliquis    #twave inversions: Patient with large twave inversions particularly in left precordial leads. Not isolated to anterior leads consistent with wellens and No current CP/SOB, SOLIS. Likely LVH with strain pattern vs possible apical HCM? No previous EKGs or echo to compare  - trop 0.45--> peaked at 0.42, but ck/ckmb wnl   - cards following, follow up recs   - echo: normal LV/RV systolic function. mild- moderate AS, mod TR/MR. pulm HTN , PASP 50mmgHg.    #Diabetes.  Plan: Previously on insulin however d/tracey outpt. FSG on admission 300, AG 14  - A1C 7.7   - started on lantus 15 and lispro 3 for 0.3units/kg   - endo consulted in setting of ?outpatient meds and if pt has good follow up   - c/w ISS  - continue to monitor.    #Anemia. Plan: hgb 9.1 on admission likely 2/2 ESRD; no previous to compare.   - continue to monitor   - f/up iron studies  - active T/S.    Inpatient treatment course:     67yo M with PMH of ESRD (TTS, last session today) via LUE fistula, T2DM, HTN, Afib? on Eliquis, L knee OA presents for recurrent Left knee effusion and pain. Patient afebrile without leukocytosis. When he came into ED likely 2/2 known erosive osteoarthritis with WBC<50,000 on synovial analysis and pt afebrile- but also with significant RBCs, ?if Eliquis use contributing.     New medications: None       Labs to be followed outpatient: CBC, BMP         Exam to be followed outpatient: [facility], Orthopedic surgery    #Discharge: do not delete    67yo M with PMH of ESRD (TTS, last session today) via LUE fistula, T2DM, HTN, Afib? on Eliquis, L knee OA presents for recurrent Left knee effusion and pain, likely 2/2 known erosive osteoarthritis with WBC<50,000 on synovial analysis and pt afebrile- but also with significant RBCs, ?if Eliquis use contributing.        Problem List/Main Diagnoses (system-based):     Knee effusion, left.  Plan: Pt with hx of recurrent left knee effusion 2/2 OA s/p drainage multiple times; most recently at [place] 2 days ago. Presents with worsening swelling, pain and difficulty walking since then.   -Knee xray with Suprapatellar effusion appreciated. s/p arthrocentesis w/ WBC 38K, neutrophil <50%, unlikely septic, possible inflammatory process. No crystals seen. Effusion likely 2/2 OA, less likely gout/pseudogout, RA. In addition, with blood tinged fluid, RBC>60K; does not appear hemiarthrosis per ortho however pt on eliquis and may have contributed to quick re effusion? No hx of bleeding disorders. Pt with continued difficulty walking in ED; admitted for PT eval  - s/p arthrocentesis in ED   - f/up ortho recs  - consider outpt MRI for recurrent effusions   - PT eval pending   - pain control     Problem/Plan - 2:  ·  Problem: ESRD (end stage renal disease) on dialysis.  Plan: ESRD (TTS, last session today) via LUE fistula however being prepped for peritoneal dialysis. euvolemic currently  - nephro consulted for HD, HD today.     Problem/Plan - 3:  ·  Problem: HTN (hypertension).  Plan: Hx of HTN; unable to recall any meds  - med rec in AM w/ [facility]   - based on MAR will continue hydral 100mg TID, Losartan 100mg and Nifedipine 90mg QD.  - also on torsemide 100mg daily, and imdur 30mg daily, per med rec. will hold for now pending further discussion with PCP/cardiologist.    #Afib.  Plan: Hx of afib. currently NSR  - c/w home coreg 25mg BID   - c/w eliquis    #twave inversions: Patient with large twave inversions particularly in left precordial leads. Not isolated to anterior leads consistent with wellens and No current CP/SOB, SOLIS. Likely LVH with strain pattern vs possible apical HCM? No previous EKGs or echo to compare  - trop 0.45--> peaked at 0.42, but ck/ckmb wnl   - cards following, follow up recs   - echo: normal LV/RV systolic function. mild- moderate AS, mod TR/MR. pulm HTN , PASP 50mmgHg.    #Diabetes.  Plan: Previously on insulin however d/tracey outpt. FSG on admission 300, AG 14  - A1C 7.7   - started on lantus 15 and lispro 3 for 0.3units/kg   - endo consulted in setting of ?outpatient meds and if pt has good follow up   - c/w ISS  - continue to monitor.    #Anemia. Plan: hgb 9.1 on admission likely 2/2 ESRD; no previous to compare.   - continue to monitor   - f/up iron studies  - active T/S.    Inpatient treatment course:     67yo M with PMH of ESRD (TTS, last session today) via LUE fistula, T2DM, HTN, Afib? on Eliquis, L knee OA presents for recurrent Left knee effusion and pain. Patient afebrile without leukocytosis. Left knee effusion, arthrocentesis done in ED and synovial analysis with WBC <50,000 but with 60,000 RBCs. Gram stain and culture from synovial fluid analysis was negative, unlikely septic arthritis. Patient admitted for PT eval. Hospital course c/b hyperglycemia in patient without good follow up/ medication adherence. Endo consult placed and recommended ______. PT recommended ______.     New medications: None       Labs to be followed outpatient: CBC, BMP         Exam to be followed outpatient: [facility], Orthopedic surgery    #Discharge: do not delete    65yo M with PMH of ESRD (TTS, last session today) via LUE fistula, T2DM, HTN, Afib? on Eliquis, L knee OA presents for recurrent Left knee effusion and pain, likely 2/2 known erosive osteoarthritis.     Problem List/Main Diagnoses (system-based):     # Knee effusion, left.    Pt with hx of recurrent left knee effusion 2/2 OA s/p drainage multiple times; most recently at [place] 2 days ago. Presents with worsening swelling, pain and difficulty walking since then.   -Knee xray with Suprapatellar effusion appreciated. s/p arthrocentesis w/ WBC 38K, neutrophil <50%, unlikely septic, possible inflammatory process. No crystals seen. Effusion likely 2/2 OA, less likely gout/pseudogout, RA. In addition, with blood tinged fluid, RBC>60K; does not appear hemiarthrosis per ortho however pt on eliquis and may have contributed to quick re effusion? No hx of bleeding disorders. Pt with continued difficulty walking in ED; admitted for PT eval  - s/p arthrocentesis in ED   - follow up with orthopedic surgery at outpatient, discuss surgical interventions   - consider outpt MRI for recurrent effusions   - PT eval recommended AALIYAH but pt declined and wants to go home   - pain control, tramadol sent to pharmacy     #ESRD (end stage renal disease) on dialysis.  Plan: ESRD (TTS, last session today) via LUE fistula however being prepped for peritoneal dialysis. euvolemic currently  - nephro consulted for HD, HD today.  - consider IV iron as outpatient for Hb ~8 and ESRD     #HTN (hypertension).  Plan: Hx of HTN; unable to recall any meds  - continue hydral 100mg TID, Losartan 100mg and Nifedipine 90mg QD.  - also on torsemide 100mg daily, and imdur 30mg daily, per med rec. will hold for now pending further discussion with PCP/cardiologist as outpatient     #Afib.  Plan: Hx of afib. currently NSR  - c/w home coreg 25mg BID   - c/w eliquis 5mg BID     #twave inversions: Patient with large twave inversions particularly in left precordial leads. Not isolated to anterior leads consistent with wellens and No current CP/SOB, SOLIS. Likely LVH with strain pattern vs possible apical HCM? No previous EKGs or echo to compare  - trop 0.45--> peaked at 0.42, but ck/ckmb wnl   - cards following, ,likely repolarization on EKG, from LVH - - No further cardiac work up necessary  - Have patient follow up with his cardiologist for further management  - echo: normal LV/RV systolic function. mild- moderate AS, mod TR/MR. pulm HTN , PASP 50mmgHg.    #Diabetes.  Plan: Previously on insulin however d/tracey outpt. FSG on admission 300, AG 14  - A1C 7.7   - c/w home Tradjenta, follow up with outpatient PCP for further management, especially if secondary oral agent is necessary   - continue to monitor.    #Anemia. Plan: hgb 9.1 on admission likely 2/2 ESRD; no previous to compare.   - pt would benefit from IV iron given ESRD, follow up with primary care doctor and nephrologist     Inpatient treatment course:     65yo M with PMH of ESRD (TTS, last session today) via LUE fistula, T2DM, HTN, Afib on Eliquis, L knee OA presents for recurrent Left knee effusion and pain. Patient afebrile without leukocytosis. For left knee effusion, arthrocentesis done in ED and synovial analysis with WBC <50,000 but with 60,000 RBCs. Gram stain and culture from synovial fluid analysis was negative, unlikely septic arthritis. Patient admitted for PT eval. Orthopedic surgery said no further intervention, there is risk of repeating arthrocentesis such a infection and he should follow up as outpatient for more definitive management (knee surgery, replacement, etc). Hospital course c/b hyperglycemia in patient without good follow up/ medication adherence. Endo consult placed and recommended patient continue Tradjenta at discharge. PT recommended AALIYAH, but patient defers and would like to go home. Patient stable and ready for discharge home, last dialysis on 6/5.     New medications: Tramadol 25 daily prn pain       Labs to be followed outpatient: CBC, BMP         Exam to be followed outpatient: [facility], Orthopedic surgery

## 2021-06-03 NOTE — PHYSICAL THERAPY INITIAL EVALUATION ADULT - GENERAL OBSERVATIONS, REHAB EVAL
Pt received sitting at EOB with +hep-lock, + L knee ace bandage C/D/I, NAD. Pt left as found, NAD, +bed alarm, call bell in reach, RN awares.

## 2021-06-03 NOTE — PROGRESS NOTE ADULT - SUBJECTIVE AND OBJECTIVE BOX
Cardiology Consult    O/N:  Interval History:  Telemetry:    OBJECTIVE  Vitals:  T(C): 36.7 (06-03-21 @ 11:50), Max: 37.3 (06-02-21 @ 16:29)  HR: 71 (06-03-21 @ 11:50) (71 - 73)  BP: 135/65 (06-03-21 @ 11:50) (133/64 - 151/63)  RR: 18 (06-03-21 @ 11:50) (17 - 18)  SpO2: 98% (06-03-21 @ 11:50) (96% - 98%)  Wt(kg): --    I/O:  I&O's Summary      PHYSICAL EXAM:  Appearance: NAD. Speaking in full sentences.   HEENT: No pallor noted.  Conjunctiva clear b/l. Moist oral mucosa.  Cardiovascular: RRR with no murmurs.  Respiratory: Lungs CTAB.   Gastrointestinal:  Soft, nontender. Non-distended. Non-rigid.	  Extremities: No edema b/l. No erythema b/l. LE WWP b/l.  Vascular: DP intact  Neurologic:  Alert and awake. Moving all extremities. Following commands.   	  LABS:                        8.1    6.90  )-----------( 259      ( 03 Jun 2021 06:04 )             25.3     06-03    133<L>  |  95<L>  |  60<H>  ----------------------------<  240<H>  5.4<H>   |  23  |  7.36<H>    Ca    8.5      03 Jun 2021 06:04  Phos  4.8     06-03  Mg     2.1     06-03    TPro  8.0  /  Alb  3.7  /  TBili  0.4  /  DBili  x   /  AST  26  /  ALT  45  /  AlkPhos  234<H>  06-01    PT/INR - ( 01 Jun 2021 14:16 )   PT: 15.6 sec;   INR: 1.32          PTT - ( 01 Jun 2021 14:16 )  PTT:32.9 sec      RADIOLOGY & ADDITIONAL TESTS:  Reviewed .    MEDICATIONS  (STANDING):  apixaban 5 milliGRAM(s) Oral every 12 hours  atorvastatin 20 milliGRAM(s) Oral at bedtime  brimonidine 0.2% Ophthalmic Solution 1 Drop(s) Both EYES two times a day  carvedilol 25 milliGRAM(s) Oral every 12 hours  chlorhexidine 2% Cloths 1 Application(s) Topical once  glucagon  Injectable 1 milliGRAM(s) IntraMuscular once  hydrALAZINE 100 milliGRAM(s) Oral every 8 hours  insulin glargine Injectable (LANTUS) 15 Unit(s) SubCutaneous at bedtime  insulin lispro (ADMELOG) corrective regimen sliding scale   SubCutaneous Before meals and at bedtime  insulin lispro Injectable (ADMELOG) 3 Unit(s) SubCutaneous three times a day before meals  ketotifen 0.025% Ophthalmic Solution 1 Drop(s) Both EYES at bedtime  losartan 100 milliGRAM(s) Oral every 24 hours  NIFEdipine XL 90 milliGRAM(s) Oral every 24 hours    MEDICATIONS  (PRN):  acetaminophen   Tablet .. 650 milliGRAM(s) Oral every 6 hours PRN Mild Pain (1 - 3)     Cardiology Consult    O/N: VALERIE  Interval History: Med rec was obtained - patient takes clonidine 0.3 patch, hydralazine 100mg TID, imdur 30mg qD, torsemide 100mg, toprol 50mg, losartan 100mg, nifedipine 90mg. BP has remained stable while on current regimen    OBJECTIVE  Vitals:  T(C): 36.7 (06-03-21 @ 11:50), Max: 37.3 (06-02-21 @ 16:29)  HR: 71 (06-03-21 @ 11:50) (71 - 73)  BP: 135/65 (06-03-21 @ 11:50) (133/64 - 151/63)  RR: 18 (06-03-21 @ 11:50) (17 - 18)  SpO2: 98% (06-03-21 @ 11:50) (96% - 98%)  Wt(kg): --    I/O:  I&O's Summary      PHYSICAL EXAM:  Appearance: NAD. Speaking in full sentences.   HEENT: No pallor noted.  Conjunctiva clear b/l. Moist oral mucosa.  Cardiovascular: RRR with no murmurs.  Neurologic:  Alert and awake. Moving all extremities. Following commands.   	  LABS:                        8.1    6.90  )-----------( 259      ( 03 Jun 2021 06:04 )             25.3     06-03    133<L>  |  95<L>  |  60<H>  ----------------------------<  240<H>  5.4<H>   |  23  |  7.36<H>    Ca    8.5      03 Jun 2021 06:04  Phos  4.8     06-03  Mg     2.1     06-03    TPro  8.0  /  Alb  3.7  /  TBili  0.4  /  DBili  x   /  AST  26  /  ALT  45  /  AlkPhos  234<H>  06-01    PT/INR - ( 01 Jun 2021 14:16 )   PT: 15.6 sec;   INR: 1.32          PTT - ( 01 Jun 2021 14:16 )  PTT:32.9 sec      RADIOLOGY & ADDITIONAL TESTS:  Reviewed .    MEDICATIONS  (STANDING):  apixaban 5 milliGRAM(s) Oral every 12 hours  atorvastatin 20 milliGRAM(s) Oral at bedtime  brimonidine 0.2% Ophthalmic Solution 1 Drop(s) Both EYES two times a day  carvedilol 25 milliGRAM(s) Oral every 12 hours  chlorhexidine 2% Cloths 1 Application(s) Topical once  glucagon  Injectable 1 milliGRAM(s) IntraMuscular once  hydrALAZINE 100 milliGRAM(s) Oral every 8 hours  insulin glargine Injectable (LANTUS) 15 Unit(s) SubCutaneous at bedtime  insulin lispro (ADMELOG) corrective regimen sliding scale   SubCutaneous Before meals and at bedtime  insulin lispro Injectable (ADMELOG) 3 Unit(s) SubCutaneous three times a day before meals  ketotifen 0.025% Ophthalmic Solution 1 Drop(s) Both EYES at bedtime  losartan 100 milliGRAM(s) Oral every 24 hours  NIFEdipine XL 90 milliGRAM(s) Oral every 24 hours    MEDICATIONS  (PRN):  acetaminophen   Tablet .. 650 milliGRAM(s) Oral every 6 hours PRN Mild Pain (1 - 3)

## 2021-06-03 NOTE — DISCHARGE NOTE PROVIDER - NSDCMRMEDTOKEN_GEN_ALL_CORE_FT
atorvastatin 20 mg oral tablet: 1 tab(s) orally once a day  azelastine 0.05% ophthalmic solution: 1 drop(s) to each affected eye 2 times a day  brimonidine 0.15% ophthalmic solution: 1 drop(s) to each affected eye 3 times a day  carvedilol 25 mg oral tablet: 1 tab(s) orally 2 times a day  cloNIDine 0.3 mg/24 hr transdermal film, extended release: 1 patch transdermal once a week  Eliquis 5 mg oral tablet: 1 tab(s) orally 2 times a day  hydrALAZINE 100 mg oral tablet: 1 tab(s) orally 3 times a day  isosorbide mononitrate 30 mg oral tablet, extended release: 1 tab(s) orally once a day (in the morning)  losartan 100 mg oral tablet: 1 tab(s) orally once a day  NIFEdipine 90 mg oral tablet, extended release: 1 tab(s) orally once a day  torsemide 100 mg oral tablet: 1 tab(s) orally once a day  Vitamin D2 50,000 intl units (1.25 mg) oral capsule: 1 cap(s) orally once a week   atorvastatin 20 mg oral tablet: 1 tab(s) orally once a day  azelastine 0.05% ophthalmic solution: 1 drop(s) to each affected eye 2 times a day  brimonidine 0.15% ophthalmic solution: 1 drop(s) to each affected eye 3 times a day  carvedilol 25 mg oral tablet: 1 tab(s) orally 2 times a day  cloNIDine 0.3 mg/24 hr transdermal film, extended release: 1 patch transdermal once a week  Eliquis 5 mg oral tablet: 1 tab(s) orally 2 times a day  hydrALAZINE 100 mg oral tablet: 1 tab(s) orally 3 times a day  isosorbide mononitrate 30 mg oral tablet, extended release: 1 tab(s) orally once a day (in the morning)  losartan 100 mg oral tablet: 1 tab(s) orally once a day  NIFEdipine 90 mg oral tablet, extended release: 1 tab(s) orally once a day  torsemide 100 mg oral tablet: 1 tab(s) orally once a day  traMADol 50 mg oral tablet: 1 tab(s) orally every 12 hours, As Needed -for severe pain MDD:100mg  Vitamin D2 50,000 intl units (1.25 mg) oral capsule: 1 cap(s) orally once a week   acetaminophen 325 mg oral tablet: 2 tab(s) orally every 6 hours, As needed, Mild Pain (1 - 3)  atorvastatin 20 mg oral tablet: 1 tab(s) orally once a day  azelastine 0.05% ophthalmic solution: 1 drop(s) to each affected eye 2 times a day  brimonidine 0.15% ophthalmic solution: 1 drop(s) to each affected eye 3 times a day  carvedilol 25 mg oral tablet: 1 tab(s) orally 2 times a day  Eliquis 5 mg oral tablet: 1 tab(s) orally 2 times a day  hydrALAZINE 100 mg oral tablet: 1 tab(s) orally 3 times a day  losartan 100 mg oral tablet: 1 tab(s) orally once a day  NIFEdipine 90 mg oral tablet, extended release: 1 tab(s) orally once a day  Tradjenta 5 mg oral tablet: 1 tab(s) orally once a day  traMADol 50 mg oral tablet: 0.5 tab(s) orally once a day, As needed, Moderate Pain (4 - 6)  Vitamin D2 50,000 intl units (1.25 mg) oral capsule: 1 cap(s) orally once a week

## 2021-06-03 NOTE — PROGRESS NOTE ADULT - PROBLEM SELECTOR PLAN 6
likely d/t ESRD; Pt. denies CP; initial EKG changes d/t misplaced leads; TTE reviewed; appreciate Cardiology recs, no need for further inpatient work-up

## 2021-06-03 NOTE — PROGRESS NOTE ADULT - PROBLEM SELECTOR PLAN 4
Hx of afib. currently NSR  - c/w home coreg 25mg BID   - restart eliquis    #twave inversions: Patient with large twave inversions particularly in left precordial leads. Not isolated to anterior leads consistent with wellens and No current CP/SOB, SOLIS. Likely LVH with strain pattern vs possible apical HCM? No previous EKGs or echo to compare  - trop 0.45--> peaked at 0.42, but ck/ckmb wnl   - cards following, will get echo and start on asa 81   - f/u echocardiogram Hx of afib. currently NSR  - c/w home coreg 25mg BID   - c/w eliquis    #twave inversions: Patient with large twave inversions particularly in left precordial leads. Not isolated to anterior leads consistent with wellens and No current CP/SOB, SOLIS. Likely LVH with strain pattern vs possible apical HCM? No previous EKGs or echo to compare  - trop 0.45--> peaked at 0.42, but ck/ckmb wnl   - cards following, follow up recs   - echo: normal LV/RV systolic function. mild- moderate AS, mod TR/MR. pulm HTN , PASP 50mmgHg

## 2021-06-03 NOTE — PROGRESS NOTE ADULT - SUBJECTIVE AND OBJECTIVE BOX
Patient is a 66y old  Male who presents with a chief complaint of L knee pain/ swelling (02 Jun 2021 14:04)      INTERVAL HPI/OVERNIGHT EVENTS:    Pt. seen and examined earlier today  Pt. c/o L knee pain and swelling, limiting ROM, unchanged  No F/C, CP, SOB, palpitations    Review of Systems: 12 point review of systems otherwise negative    MEDICATIONS  (STANDING):  apixaban 5 milliGRAM(s) Oral every 12 hours  atorvastatin 20 milliGRAM(s) Oral at bedtime  brimonidine 0.2% Ophthalmic Solution 1 Drop(s) Both EYES two times a day  carvedilol 25 milliGRAM(s) Oral every 12 hours  epoetin audrey-epbx (RETACRIT) Injectable 8000 Unit(s) IV Push once  glucagon  Injectable 1 milliGRAM(s) IntraMuscular once  hydrALAZINE 100 milliGRAM(s) Oral every 8 hours  insulin glargine Injectable (LANTUS) 15 Unit(s) SubCutaneous at bedtime  insulin lispro (ADMELOG) corrective regimen sliding scale   SubCutaneous Before meals and at bedtime  insulin lispro Injectable (ADMELOG) 3 Unit(s) SubCutaneous three times a day before meals  ketotifen 0.025% Ophthalmic Solution 1 Drop(s) Both EYES at bedtime  losartan 100 milliGRAM(s) Oral every 24 hours  NIFEdipine XL 90 milliGRAM(s) Oral every 24 hours    MEDICATIONS  (PRN):  acetaminophen   Tablet .. 650 milliGRAM(s) Oral every 6 hours PRN Mild Pain (1 - 3)      Allergies    No Known Allergies    Intolerances          Vital Signs Last 24 Hrs  T(C): 36.7 (03 Jun 2021 08:49), Max: 37.3 (02 Jun 2021 16:29)  T(F): 98.1 (03 Jun 2021 08:49), Max: 99.1 (02 Jun 2021 16:29)  HR: 72 (03 Jun 2021 08:49) (72 - 73)  BP: 144/67 (03 Jun 2021 08:49) (133/64 - 151/63)  BP(mean): --  RR: 18 (03 Jun 2021 08:49) (17 - 18)  SpO2: 98% (03 Jun 2021 08:49) (96% - 98%)  CAPILLARY BLOOD GLUCOSE      POCT Blood Glucose.: 253 mg/dL (03 Jun 2021 08:16)  POCT Blood Glucose.: 338 mg/dL (02 Jun 2021 21:46)  POCT Blood Glucose.: 331 mg/dL (02 Jun 2021 17:12)  POCT Blood Glucose.: 195 mg/dL (02 Jun 2021 11:52)        Physical Exam:  (earlier today)  Daily     Daily   General:  well-appearing in NAD, eating breakfast  HEENT:  MMM  CV:  no JVD  Extremities:  +LUE AVF +L knee ACE wrap C/D/I  Skin:  WWP  Neuro:  AAOx3    LABS:                        8.1    6.90  )-----------( 259      ( 03 Jun 2021 06:04 )             25.3     06-03    133<L>  |  95<L>  |  60<H>  ----------------------------<  240<H>  5.4<H>   |  23  |  7.36<H>    Ca    8.5      03 Jun 2021 06:04  Phos  4.8     06-03  Mg     2.1     06-03    TPro  8.0  /  Alb  3.7  /  TBili  0.4  /  DBili  x   /  AST  26  /  ALT  45  /  AlkPhos  234<H>  06-01    PT/INR - ( 01 Jun 2021 14:16 )   PT: 15.6 sec;   INR: 1.32          PTT - ( 01 Jun 2021 14:16 )  PTT:32.9 sec

## 2021-06-03 NOTE — CONSULT NOTE ADULT - SUBJECTIVE AND OBJECTIVE BOX
HPI: 66yMale    Age at Dx:  How dx:  Hx and duration of insulin:  Current Therapy:  Hx of hypoglycemia  Hx of DKA/HHS?    Home FSG:  Fasting  Lunch  Dinner  Bed    Hx of other regimens  Complications:  Outpatient Endo:    PMH & Surgical Hx:KNEE EFFUSION; LEFT    FH: type 2 diabetes mellitus    Handoff    MEWS Score    ESRD on dialysis    HTN (hypertension)    Type 2 diabetes mellitus    Knee effusion, left    Knee effusion, left    ESRD (end stage renal disease) on dialysis    HTN (hypertension)    Diabetes    Anemia    Nutrition, metabolism, and development symptoms    Need for prophylactic measure    Afib    Type 2 diabetes mellitus without complication, with long-term current use of insulin    Essential hypertension    Chronic atrial fibrillation    Troponin level elevated    Anemia of renal disease    Left toe amputee    KNEE PAIN    90+    Gait instability    SysAdmin_VisitLink        FH:  DM:  Thyroid:  Autoimmune:  Other:    SH:  Smoking  Etoh:  Recreational Drugs:  Social Life:    Current Meds:  acetaminophen   Tablet .. 650 milliGRAM(s) Oral every 6 hours PRN  apixaban 5 milliGRAM(s) Oral every 12 hours  atorvastatin 20 milliGRAM(s) Oral at bedtime  brimonidine 0.2% Ophthalmic Solution 1 Drop(s) Both EYES two times a day  carvedilol 25 milliGRAM(s) Oral every 12 hours  chlorhexidine 2% Cloths 1 Application(s) Topical once  glucagon  Injectable 1 milliGRAM(s) IntraMuscular once  hydrALAZINE 100 milliGRAM(s) Oral every 8 hours  insulin glargine Injectable (LANTUS) 15 Unit(s) SubCutaneous at bedtime  insulin lispro (ADMELOG) corrective regimen sliding scale   SubCutaneous Before meals and at bedtime  insulin lispro Injectable (ADMELOG) 3 Unit(s) SubCutaneous three times a day before meals  ketotifen 0.025% Ophthalmic Solution 1 Drop(s) Both EYES at bedtime  losartan 100 milliGRAM(s) Oral every 24 hours  NIFEdipine XL 90 milliGRAM(s) Oral every 24 hours      Allergies:  No Known Allergies      ROS:  Denies the following except as indicated.    General: weight loss/weight gain, decreased appetite, fatigue  Eyes: Blurry vision, double vision, visual changes  ENT: Throat pain, changes in voice,   CV: palpitations, SOB, CP, cough  GI: NVD, difficulty swallowing, abdominal pain  : polyuria, dysuria  Endo: abnormal menses, temperature intolerance, decreased libido  MSK: weakness, joint pain  Skin: rash, dryness, diaphoresis  Heme: Easy bruising,bleeding  Neuro: HA, dizziness, lightheadedness, numbness tingling  Psych: Anxiety, Depression    Vital Signs Last 24 Hrs  T(C): 36.7 (03 Jun 2021 11:50), Max: 37.3 (02 Jun 2021 16:29)  T(F): 98.1 (03 Jun 2021 11:50), Max: 99.1 (02 Jun 2021 16:29)  HR: 71 (03 Jun 2021 11:50) (71 - 73)  BP: 135/65 (03 Jun 2021 11:50) (133/64 - 151/63)  BP(mean): --  RR: 18 (03 Jun 2021 11:50) (17 - 18)  SpO2: 98% (03 Jun 2021 11:50) (96% - 98%)  Height (cm): 193 (06-01 @ 13:13)  Weight (kg): 77.1 (06-01 @ 13:13)  BMI (kg/m2): 20.7 (06-01 @ 13:13)      Constitutional: wn/wd in NAD.   HEENT: NCAT, MMM, OP clear, EOMI, , no proptosis or lid retraction  Neck: no thyromegaly or palpable thyroid nodules   Respiratory: lungs CTAB.  Cardiovascular: regular rhythm, normal S1 and S2, no audible murmurs, no peripheral edema  GI: soft, NT/ND, no masses/HSM appreciated.  Neurology: no tremors, DTR 2+  Skin: no visible rashes/lesions  Psychiatric: AAO x 3, normal affect/mood.  Ext: radial pulses intact, DP pulses intact, extremities warm, no cyanosis, clubbing or edema.       LABS:                        8.1    6.90  )-----------( 259      ( 03 Jun 2021 06:04 )             25.3     06-03    133<L>  |  95<L>  |  60<H>  ----------------------------<  240<H>  5.4<H>   |  23  |  7.36<H>    Ca    8.5      03 Jun 2021 06:04  Phos  4.8     06-03  Mg     2.1     06-03    TPro  8.0  /  Alb  3.7  /  TBili  0.4  /  DBili  x   /  AST  26  /  ALT  45  /  AlkPhos  234<H>  06-01    PT/INR - ( 01 Jun 2021 14:16 )   PT: 15.6 sec;   INR: 1.32          PTT - ( 01 Jun 2021 14:16 )  PTT:32.9 sec      Thyroid Stimulating Hormone, Serum: 1.450 (06-01 @ 14:16)      RADIOLOGY & ADDITIONAL STUDIES:  CAPILLARY BLOOD GLUCOSE      POCT Blood Glucose.: 253 mg/dL (03 Jun 2021 08:16)  POCT Blood Glucose.: 338 mg/dL (02 Jun 2021 21:46)  POCT Blood Glucose.: 331 mg/dL (02 Jun 2021 17:12)        A/P:66y Male    1.  DM  Please continue lantus       units at night / morning.  Please continue lispro      units before each meal.  Please continue lispro moderate / low dose sliding scale four times daily with meals and at bedtime    Pt's fingerstick glucose goal is     Will continue to monitor     For discharge, pt can continue    Pt can follow up at discharge with [facility] by calling  to make an appointment.   Will discuss case with     and update primary team 67yo M with PMH of ESRD (TTS, via LUE fistula), DM2, HTN, Afib? on Eliquis, L knee OA who presented to [facility] ED with recurrent left knee effusion (recent drained at OSH) and pain, likely 2/2 known erosive osteoarthritis. S/p arthrocentesis with ortho on day of admission showing inflammatory process likely 2/2 OA. A1c was 7.7%. Pt initially with FSBG in 300s and has ranged 130-330s since admission, Endocrine consulted for DM management in ESRD.    Regarding DM, pt was dxed in 1997. Has been on multiple oral medications in the past which were dced d/t worsening renal function. Pt also states that he was on insulin in the past but it was d/tracey d/t hypoglycemia. Pt states that AM FSBG are usually 70-80 and will go up as high as 250 around lunch. Often eats rice at lunchtime. Denies eating sweets or sugary beverages. Current home med is Tradjenta 5mg. Has his eyes checked frequently.     Spoked with patient in HD center while he was undergoing dialysis Patient is currently complaining of severe R knee pain    PMH & Surgical Hx:KNEE EFFUSION; LEFT    FH: type 2 diabetes mellitus  ESRD on dialysis  HTN (hypertension)  Type 2 diabetes mellitus  Knee effusion, left  Diabetes  Anemia  Afib  Chronic atrial fibrillation  Troponin level elevated  Anemia of renal disease  Left toe amputee    Current Meds:  acetaminophen   Tablet .. 650 milliGRAM(s) Oral every 6 hours PRN  apixaban 5 milliGRAM(s) Oral every 12 hours  atorvastatin 20 milliGRAM(s) Oral at bedtime  brimonidine 0.2% Ophthalmic Solution 1 Drop(s) Both EYES two times a day  carvedilol 25 milliGRAM(s) Oral every 12 hours  chlorhexidine 2% Cloths 1 Application(s) Topical once  glucagon  Injectable 1 milliGRAM(s) IntraMuscular once  hydrALAZINE 100 milliGRAM(s) Oral every 8 hours  insulin glargine Injectable (LANTUS) 15 Unit(s) SubCutaneous at bedtime  insulin lispro (ADMELOG) corrective regimen sliding scale   SubCutaneous Before meals and at bedtime  insulin lispro Injectable (ADMELOG) 3 Unit(s) SubCutaneous three times a day before meals  ketotifen 0.025% Ophthalmic Solution 1 Drop(s) Both EYES at bedtime  losartan 100 milliGRAM(s) Oral every 24 hours  NIFEdipine XL 90 milliGRAM(s) Oral every 24 hours      Allergies:  No Known Allergies      ROS:  Denies the following except as indicated.    General: weight loss/weight gain, decreased appetite, fatigue  Eyes: Blurry vision, double vision, visual changes  ENT: Throat pain, changes in voice,   CV: palpitations, SOB, CP, cough  GI: NVD, difficulty swallowing, abdominal pain  : polyuria, dysuria  Endo: abnormal menses, temperature intolerance, decreased libido  MSK: weakness, joint pain  Skin: rash, dryness, diaphoresis  Heme: Easy bruising,bleeding  Neuro: HA, dizziness, lightheadedness, numbness tingling  Psych: Anxiety, Depression    Vital Signs Last 24 Hrs  T(C): 36.7 (03 Jun 2021 11:50), Max: 37.3 (02 Jun 2021 16:29)  T(F): 98.1 (03 Jun 2021 11:50), Max: 99.1 (02 Jun 2021 16:29)  HR: 71 (03 Jun 2021 11:50) (71 - 73)  BP: 135/65 (03 Jun 2021 11:50) (133/64 - 151/63)  BP(mean): --  RR: 18 (03 Jun 2021 11:50) (17 - 18)  SpO2: 98% (03 Jun 2021 11:50) (96% - 98%)  Height (cm): 193 (06-01 @ 13:13)  Weight (kg): 77.1 (06-01 @ 13:13)  BMI (kg/m2): 20.7 (06-01 @ 13:13)      Constitutional: wn/wd in NAD.   HEENT: NCAT, MMM, OP clear, EOMI, , no proptosis or lid retraction  Neck: no thyromegaly or palpable thyroid nodules   Respiratory: lungs CTAB.  Cardiovascular: regular rhythm, normal S1 and S2, no audible murmurs, no peripheral edema  GI: soft, NT/ND, no masses/HSM appreciated.  Neurology: no tremors, DTR 2+  Skin: no visible rashes/lesions  Psychiatric: AAO x 3, normal affect/mood.  Ext: radial pulses intact, DP pulses intact, extremities warm, no cyanosis, clubbing or edema. R knee wrapped in ACE wrap and visibly edematous under the wrapping, R leg warm to touch, LUE fistula.      LABS:                        8.1    6.90  )-----------( 259      ( 03 Jun 2021 06:04 )             25.3     06-03    133<L>  |  95<L>  |  60<H>  ----------------------------<  240<H>  5.4<H>   |  23  |  7.36<H>    Ca    8.5      03 Jun 2021 06:04  Phos  4.8     06-03  Mg     2.1     06-03    TPro  8.0  /  Alb  3.7  /  TBili  0.4  /  DBili  x   /  AST  26  /  ALT  45  /  AlkPhos  234<H>  06-01    PT/INR - ( 01 Jun 2021 14:16 )   PT: 15.6 sec;   INR: 1.32          PTT - ( 01 Jun 2021 14:16 )  PTT:32.9 sec      Thyroid Stimulating Hormone, Serum: 1.450 (06-01 @ 14:16)      RADIOLOGY & ADDITIONAL STUDIES:  CAPILLARY BLOOD GLUCOSE      POCT Blood Glucose.: 253 mg/dL (03 Jun 2021 08:16)  POCT Blood Glucose.: 338 mg/dL (02 Jun 2021 21:46)  POCT Blood Glucose.: 331 mg/dL (02 Jun 2021 17:12)

## 2021-06-03 NOTE — PHYSICAL THERAPY INITIAL EVALUATION ADULT - PERTINENT HX OF CURRENT PROBLEM, REHAB EVAL
Pt is a 67 yo male OA s/p drainage multiple times; most recently at [place] 2 days ago. Presents with worsening swelling, pain and difficulty walking Pt is a 65 yo male presents for recurrent Left knee effusion and pain.

## 2021-06-03 NOTE — CONSULT NOTE ADULT - ATTENDING COMMENTS
Initial attending contact date   6/2/21   . See fellow note written above for details. I reviewed the fellow documentation. I have personally seen and examined this patient. I reviewed vitals, labs, medications, cardiac studies, and additional imaging. I agree with the above fellow's findings and plans as written above with the following additions/statements.    -66 M PMH HTN, DM, ?AF on eliquis, ESRD (TTS, LUE fistula), L knee OA p/w knee pain and effusion, incidentally found with abnormal ekg and elevated troponin 0.45  -Initial EKG with misplaced leads  -Repeat EKG with NSR with ST changes sec to LVH, PVC  -ECHO with normal LVEF, mild-mod AS with mean gradient 20, Mod MR, Mod TR with PA press 50  -Elevated troponin likely in setting of ESRD  -No need for further work up at this time given pt is without any anginal equivalents  -Can uptitrate hydralazine 100 mg tid for better BP control. Cont losartan/coreg/nifedipine  -Parox  a fib: currently NSR. Cont eliquis
admitted for knee effusion, dialyzed today per TTS schedule of HD, hyperkalemia needs low K diet. HTN well controlled. Anemia w Fe Deficiency and 2/2 renal disease, given epo 8000u with HD. Recommend giving IV Fe with HD sessions as well
Pt seen on rounds this afternoon.  Has type 2 DM and ESRD, admitted for acute (recurrent) arthritis L knee, with marked pain and effusion.  Was tapped by Ortho, fluid non-diagnostic, and pt still in significant pain.  Takes Tradjenta at home--no other oral agents or insulin.  Is on basal/bolus in the hospital but glucoses still frequently > 300  For now, keep the Lantus at 10 units given expected low basal requirements with his ESRD, but start pre-meal of 5 units lispro.  His glucoses as outpatient are probably controllable with Tradjenta but he needs to decrease his carb intake at lunch and supper.  With etiology of knee effusion unclear, would have Rheumatology see himi

## 2021-06-03 NOTE — PROGRESS NOTE ADULT - PROBLEM SELECTOR PLAN 5
Previously on insulin however d/tracey outpt. FSG on admission 300, AG 14  - med rec in AM  - f/up a1c  - c/w ISS  - continue to monitor Previously on insulin however d/tracey outpt. FSG on admission 300, AG 14  - A1C 7.7   - started on lantus 15 and lispro 3 for 0.3units/kg   - endo consulted in setting of ?outpatient meds and if pt has good follow up   - c/w ISS  - continue to monitor

## 2021-06-03 NOTE — PROGRESS NOTE ADULT - SUBJECTIVE AND OBJECTIVE BOX
OVERNIGHT EVENTS:  As per nurse and patient, no o/n events, patient resting comfortably.    INTERVAL HPI:   Patient was seen and examined at bedside. No complaints at this time. Patient denies: fever, chills, dizziness, weakness, HA, Changes in vision, CP, palpitations, SOB, cough, N/V/D/C, dysuria, changes in bowel movements, LE edema. ROS otherwise negative.    VITAL SIGNS:  T(F): 98.4 (06-03-21 @ 05:33)  HR: 72 (06-03-21 @ 05:33)  BP: 133/64 (06-03-21 @ 05:33)  RR: 18 (06-03-21 @ 05:33)  SpO2: 97% (06-03-21 @ 05:33)  Wt(kg): --    PHYSICAL EXAM:    Constitutional: WDWN, NAD  HEENT: PERRL, EOMI, sclera non-icteric, neck supple, trachea midline, no masses, no JVD, MMM, good dentition  Respiratory: CTA b/l, good air entry b/l, no wheezing, no rhonchi, no rales, without accessory muscle use and no intercostal retractions  Cardiovascular: RRR, normal S1S2, no M/R/G  Gastrointestinal: soft, NTND, no masses palpable, BS normal  Extremities: Warm, well perfused, pulses equal bilateral upper and lower extremities, no edema, no clubbing  Neurological: AAOx3, CN Grossly intact  Skin: Normal temperature, warm, dry    MEDICATIONS  (STANDING):  apixaban 5 milliGRAM(s) Oral every 12 hours  atorvastatin 20 milliGRAM(s) Oral at bedtime  brimonidine 0.2% Ophthalmic Solution 1 Drop(s) Both EYES two times a day  carvedilol 25 milliGRAM(s) Oral every 12 hours  glucagon  Injectable 1 milliGRAM(s) IntraMuscular once  hydrALAZINE 100 milliGRAM(s) Oral every 8 hours  insulin glargine Injectable (LANTUS) 10 Unit(s) SubCutaneous at bedtime  insulin lispro (ADMELOG) corrective regimen sliding scale   SubCutaneous Before meals and at bedtime  ketotifen 0.025% Ophthalmic Solution 1 Drop(s) Both EYES at bedtime  losartan 100 milliGRAM(s) Oral every 24 hours  NIFEdipine XL 90 milliGRAM(s) Oral every 24 hours    MEDICATIONS  (PRN):  acetaminophen   Tablet .. 650 milliGRAM(s) Oral every 6 hours PRN Mild Pain (1 - 3)      Allergies    No Known Allergies    Intolerances        LABS:                        8.1    6.90  )-----------( 259      ( 03 Jun 2021 06:04 )             25.3     06-03    133<L>  |  95<L>  |  60<H>  ----------------------------<  240<H>  5.4<H>   |  23  |  7.36<H>    Ca    8.5      03 Jun 2021 06:04  Phos  4.8     06-03  Mg     2.1     06-03    TPro  8.0  /  Alb  3.7  /  TBili  0.4  /  DBili  x   /  AST  26  /  ALT  45  /  AlkPhos  234<H>  06-01    PT/INR - ( 01 Jun 2021 14:16 )   PT: 15.6 sec;   INR: 1.32          PTT - ( 01 Jun 2021 14:16 )  PTT:32.9 sec        RADIOLOGY & ADDITIONAL TESTS:  Reviewed OVERNIGHT EVENTS:  As per nurse and patient, no o/n events, patient resting comfortably.    INTERVAL HPI:   Patient was seen and examined at bedside. He states his left knee swelling is worsening from yesterday. He also states his left knee is painful with movements. Patient denies: fever, chills, dizziness, weakness, HA, Changes in vision, CP, palpitations, SOB, cough, N/V/D/C, dysuria, changes in bowel movements, LE edema. ROS otherwise negative.    VITAL SIGNS:  T(F): 98.4 (06-03-21 @ 05:33)  HR: 72 (06-03-21 @ 05:33)  BP: 133/64 (06-03-21 @ 05:33)  RR: 18 (06-03-21 @ 05:33)  SpO2: 97% (06-03-21 @ 05:33)  Wt(kg): --    PHYSICAL EXAM:    Constitutional: WDWN, NAD  HEENT: PERRL, EOMI, sclera non-icteric, neck supple, trachea midline, no masses, no JVD, MMM,  Respiratory: CTA b/l, good air entry b/l, no wheezing, no rhonchi, no rales, without accessory muscle use and no intercostal retractions  Cardiovascular: irregular rhythm, regular rate, normal S1S2, no M/R/G  Gastrointestinal: soft, NTND, no masses palpable, BS normal  Extremities: left knee- edematous, with prepatellar effusion, limited range of motion, nontender to palpation. Warm, well perfused, pulses equal bilateral upper and lower extremities, no edema, no clubbing  Neurological: AAOx3, CN Grossly intact  Skin: Normal temperature, warm, dry      MEDICATIONS  (STANDING):  apixaban 5 milliGRAM(s) Oral every 12 hours  atorvastatin 20 milliGRAM(s) Oral at bedtime  brimonidine 0.2% Ophthalmic Solution 1 Drop(s) Both EYES two times a day  carvedilol 25 milliGRAM(s) Oral every 12 hours  glucagon  Injectable 1 milliGRAM(s) IntraMuscular once  hydrALAZINE 100 milliGRAM(s) Oral every 8 hours  insulin glargine Injectable (LANTUS) 10 Unit(s) SubCutaneous at bedtime  insulin lispro (ADMELOG) corrective regimen sliding scale   SubCutaneous Before meals and at bedtime  ketotifen 0.025% Ophthalmic Solution 1 Drop(s) Both EYES at bedtime  losartan 100 milliGRAM(s) Oral every 24 hours  NIFEdipine XL 90 milliGRAM(s) Oral every 24 hours    MEDICATIONS  (PRN):  acetaminophen   Tablet .. 650 milliGRAM(s) Oral every 6 hours PRN Mild Pain (1 - 3)      Allergies    No Known Allergies    Intolerances        LABS:                        8.1    6.90  )-----------( 259      ( 03 Jun 2021 06:04 )             25.3     06-03    133<L>  |  95<L>  |  60<H>  ----------------------------<  240<H>  5.4<H>   |  23  |  7.36<H>    Ca    8.5      03 Jun 2021 06:04  Phos  4.8     06-03  Mg     2.1     06-03    TPro  8.0  /  Alb  3.7  /  TBili  0.4  /  DBili  x   /  AST  26  /  ALT  45  /  AlkPhos  234<H>  06-01    PT/INR - ( 01 Jun 2021 14:16 )   PT: 15.6 sec;   INR: 1.32          PTT - ( 01 Jun 2021 14:16 )  PTT:32.9 sec        RADIOLOGY & ADDITIONAL TESTS:  Reviewed

## 2021-06-03 NOTE — PROGRESS NOTE ADULT - ASSESSMENT
A 65yo M with PMH of ESRD (TTS, last session today) via LUE fistula, T2DM, HTN, Afib? on Eliquis, L knee OA presents for recurrent Left knee effusion and pain, likely 2/2 known erosive osteoarthritis with WBC<50,000 on synovial analysis and pt afebrile- but also with significant RBCs, ?if Eliquis use contributing.

## 2021-06-03 NOTE — CONSULT NOTE ADULT - SUBJECTIVE AND OBJECTIVE BOX
Patient is a 66y old  Male who presents with a chief complaint of L knee pain/ swelling (02 Jun 2021 14:04)      HPI:  66M PMHx ESRD (TTS via LUE fistula), T2DM, HTN, Afib? on Eliquis, L knee OA presents for recurrent Left knee effusion and pain. Endorses worsening pain to LLE. Denies CP SOB or trauma. No fevers. Last hemodialysis Tuesday per schedule. Nephrology consulted for hemodialysis.     PAST MEDICAL & SURGICAL HISTORY:  ESRD on dialysis    HTN (hypertension)    Type 2 diabetes mellitus    Left toe amputee          Allergies:  No Known Allergies      Home Medications:   acetaminophen   Tablet .. 650 milliGRAM(s) Oral every 6 hours PRN  apixaban 5 milliGRAM(s) Oral every 12 hours  atorvastatin 20 milliGRAM(s) Oral at bedtime  brimonidine 0.2% Ophthalmic Solution 1 Drop(s) Both EYES two times a day  carvedilol 25 milliGRAM(s) Oral every 12 hours  glucagon  Injectable 1 milliGRAM(s) IntraMuscular once  hydrALAZINE 100 milliGRAM(s) Oral every 8 hours  insulin glargine Injectable (LANTUS) 15 Unit(s) SubCutaneous at bedtime  insulin lispro (ADMELOG) corrective regimen sliding scale   SubCutaneous Before meals and at bedtime  insulin lispro Injectable (ADMELOG) 3 Unit(s) SubCutaneous three times a day before meals  ketotifen 0.025% Ophthalmic Solution 1 Drop(s) Both EYES at bedtime  losartan 100 milliGRAM(s) Oral every 24 hours  NIFEdipine XL 90 milliGRAM(s) Oral every 24 hours      Hospital Medications:   MEDICATIONS  (STANDING):  apixaban 5 milliGRAM(s) Oral every 12 hours  atorvastatin 20 milliGRAM(s) Oral at bedtime  brimonidine 0.2% Ophthalmic Solution 1 Drop(s) Both EYES two times a day  carvedilol 25 milliGRAM(s) Oral every 12 hours  glucagon  Injectable 1 milliGRAM(s) IntraMuscular once  hydrALAZINE 100 milliGRAM(s) Oral every 8 hours  insulin glargine Injectable (LANTUS) 15 Unit(s) SubCutaneous at bedtime  insulin lispro (ADMELOG) corrective regimen sliding scale   SubCutaneous Before meals and at bedtime  insulin lispro Injectable (ADMELOG) 3 Unit(s) SubCutaneous three times a day before meals  ketotifen 0.025% Ophthalmic Solution 1 Drop(s) Both EYES at bedtime  losartan 100 milliGRAM(s) Oral every 24 hours  NIFEdipine XL 90 milliGRAM(s) Oral every 24 hours      SOCIAL HISTORY:  Denies ETOh, Smoking,     Family History:  FAMILY HISTORY:  FH: type 2 diabetes mellitus          VITALS:  T(F): 98.1 (06-03-21 @ 08:49), Max: 99.1 (06-02-21 @ 16:29)  HR: 72 (06-03-21 @ 08:49)  BP: 144/67 (06-03-21 @ 08:49)  RR: 18 (06-03-21 @ 08:49)  SpO2: 98% (06-03-21 @ 08:49)  Wt(kg): --      CAPILLARY BLOOD GLUCOSE      POCT Blood Glucose.: 253 mg/dL (03 Jun 2021 08:16)  POCT Blood Glucose.: 338 mg/dL (02 Jun 2021 21:46)  POCT Blood Glucose.: 331 mg/dL (02 Jun 2021 17:12)  POCT Blood Glucose.: 195 mg/dL (02 Jun 2021 11:52)      Review of Systems:  CONSTITUTIONAL: No fever   RESPIRATORY: No shortness of breath, cough  CARDIOVASCULAR: No Chest pain, shortness of breath  GASTROINTESTINAL: No abdominal pain, nausea, vomiting, diarrhea  MUSCULOSKELETAL: +swelling, +pain to LLE       PHYSICAL EXAM:  GENERAL: Alert, awake, oriented x3 on RA   HEENT: no JVP  CHEST/LUNG: Bilateral clear breath sounds  HEART: Regular rate and rhythm, no murmur, no gallops, no rub   ABDOMEN: Soft, nontender, non distended  EXTREMITIES: 1+ pedal edema  Neurology: AAOx3, no asterixis   ACCESS: +LUE AVF w/bruit and thrill     LABS:  06-03    133<L>  |  95<L>  |  60<H>  ----------------------------<  240<H>  5.4<H>   |  23  |  7.36<H>    Ca    8.5      03 Jun 2021 06:04  Phos  4.8     06-03  Mg     2.1     06-03    TPro  8.0  /  Alb  3.7  /  TBili  0.4  /  DBili      /  AST  26  /  ALT  45  /  AlkPhos  234<H>  06-01    Creatinine Trend: 7.36 <--, 5.99 <--, 4.47 <--                        8.1    6.90  )-----------( 259      ( 03 Jun 2021 06:04 )             25.3     Urine Studies:

## 2021-06-03 NOTE — PHYSICAL THERAPY INITIAL EVALUATION ADULT - ADDITIONAL COMMENTS
Pt lives with family in an apt with 4 flights walk up. Prior to admission, pt ambulated with SC, pt also has rolling walker at home. Pt denies recent fall.

## 2021-06-04 LAB
ANION GAP SERPL CALC-SCNC: 13 MMOL/L — SIGNIFICANT CHANGE UP (ref 5–17)
BUN SERPL-MCNC: 41 MG/DL — HIGH (ref 7–23)
CALCIUM SERPL-MCNC: 8.8 MG/DL — SIGNIFICANT CHANGE UP (ref 8.4–10.5)
CHLORIDE SERPL-SCNC: 94 MMOL/L — LOW (ref 96–108)
CO2 SERPL-SCNC: 25 MMOL/L — SIGNIFICANT CHANGE UP (ref 22–31)
CREAT SERPL-MCNC: 5.13 MG/DL — HIGH (ref 0.5–1.3)
FRUCTOSAMINE SERPL-MCNC: 398 UMOL/L — HIGH (ref 205–285)
GLUCOSE BLDC GLUCOMTR-MCNC: 163 MG/DL — HIGH (ref 70–99)
GLUCOSE BLDC GLUCOMTR-MCNC: 164 MG/DL — HIGH (ref 70–99)
GLUCOSE BLDC GLUCOMTR-MCNC: 170 MG/DL — HIGH (ref 70–99)
GLUCOSE BLDC GLUCOMTR-MCNC: 210 MG/DL — HIGH (ref 70–99)
GLUCOSE SERPL-MCNC: 187 MG/DL — HIGH (ref 70–99)
HCT VFR BLD CALC: 26.1 % — LOW (ref 39–50)
HGB BLD-MCNC: 8.1 G/DL — LOW (ref 13–17)
MAGNESIUM SERPL-MCNC: 2.1 MG/DL — SIGNIFICANT CHANGE UP (ref 1.6–2.6)
MCHC RBC-ENTMCNC: 26.1 PG — LOW (ref 27–34)
MCHC RBC-ENTMCNC: 31 GM/DL — LOW (ref 32–36)
MCV RBC AUTO: 84.2 FL — SIGNIFICANT CHANGE UP (ref 80–100)
NRBC # BLD: 0 /100 WBCS — SIGNIFICANT CHANGE UP (ref 0–0)
PHOSPHATE SERPL-MCNC: 4.1 MG/DL — SIGNIFICANT CHANGE UP (ref 2.5–4.5)
PLATELET # BLD AUTO: 268 K/UL — SIGNIFICANT CHANGE UP (ref 150–400)
POTASSIUM SERPL-MCNC: 5.1 MMOL/L — SIGNIFICANT CHANGE UP (ref 3.5–5.3)
POTASSIUM SERPL-SCNC: 5.1 MMOL/L — SIGNIFICANT CHANGE UP (ref 3.5–5.3)
RBC # BLD: 3.1 M/UL — LOW (ref 4.2–5.8)
RBC # FLD: 15.7 % — HIGH (ref 10.3–14.5)
SODIUM SERPL-SCNC: 132 MMOL/L — LOW (ref 135–145)
WBC # BLD: 6.23 K/UL — SIGNIFICANT CHANGE UP (ref 3.8–10.5)
WBC # FLD AUTO: 6.23 K/UL — SIGNIFICANT CHANGE UP (ref 3.8–10.5)

## 2021-06-04 PROCEDURE — 99233 SBSQ HOSP IP/OBS HIGH 50: CPT | Mod: GC

## 2021-06-04 PROCEDURE — 99231 SBSQ HOSP IP/OBS SF/LOW 25: CPT | Mod: GC

## 2021-06-04 RX ORDER — INSULIN GLARGINE 100 [IU]/ML
12 INJECTION, SOLUTION SUBCUTANEOUS AT BEDTIME
Refills: 0 | Status: DISCONTINUED | OUTPATIENT
Start: 2021-06-04 | End: 2021-06-05

## 2021-06-04 RX ORDER — TRAMADOL HYDROCHLORIDE 50 MG/1
1 TABLET ORAL
Qty: 6 | Refills: 0
Start: 2021-06-04 | End: 2021-06-06

## 2021-06-04 RX ORDER — TRAMADOL HYDROCHLORIDE 50 MG/1
25 TABLET ORAL DAILY
Refills: 0 | Status: DISCONTINUED | OUTPATIENT
Start: 2021-06-04 | End: 2021-06-05

## 2021-06-04 RX ADMIN — Medication 100 MILLIGRAM(S): at 22:52

## 2021-06-04 RX ADMIN — BRIMONIDINE TARTRATE 1 DROP(S): 2 SOLUTION/ DROPS OPHTHALMIC at 18:22

## 2021-06-04 RX ADMIN — LOSARTAN POTASSIUM 100 MILLIGRAM(S): 100 TABLET, FILM COATED ORAL at 06:29

## 2021-06-04 RX ADMIN — Medication 2: at 18:22

## 2021-06-04 RX ADMIN — KETOTIFEN FUMARATE 1 DROP(S): 0.34 SOLUTION OPHTHALMIC at 22:07

## 2021-06-04 RX ADMIN — Medication 1: at 12:30

## 2021-06-04 RX ADMIN — CARVEDILOL PHOSPHATE 25 MILLIGRAM(S): 80 CAPSULE, EXTENDED RELEASE ORAL at 22:51

## 2021-06-04 RX ADMIN — APIXABAN 5 MILLIGRAM(S): 2.5 TABLET, FILM COATED ORAL at 12:59

## 2021-06-04 RX ADMIN — BRIMONIDINE TARTRATE 1 DROP(S): 2 SOLUTION/ DROPS OPHTHALMIC at 06:29

## 2021-06-04 RX ADMIN — CARVEDILOL PHOSPHATE 25 MILLIGRAM(S): 80 CAPSULE, EXTENDED RELEASE ORAL at 09:13

## 2021-06-04 RX ADMIN — ATORVASTATIN CALCIUM 20 MILLIGRAM(S): 80 TABLET, FILM COATED ORAL at 22:52

## 2021-06-04 RX ADMIN — Medication 1: at 22:53

## 2021-06-04 RX ADMIN — APIXABAN 5 MILLIGRAM(S): 2.5 TABLET, FILM COATED ORAL at 23:00

## 2021-06-04 RX ADMIN — Medication 1: at 08:57

## 2021-06-04 RX ADMIN — Medication 5 UNIT(S): at 08:56

## 2021-06-04 RX ADMIN — Medication 5 UNIT(S): at 12:30

## 2021-06-04 RX ADMIN — Medication 100 MILLIGRAM(S): at 14:52

## 2021-06-04 RX ADMIN — INSULIN GLARGINE 12 UNIT(S): 100 INJECTION, SOLUTION SUBCUTANEOUS at 22:54

## 2021-06-04 RX ADMIN — Medication 100 MILLIGRAM(S): at 06:29

## 2021-06-04 RX ADMIN — Medication 5 UNIT(S): at 18:21

## 2021-06-04 NOTE — PROGRESS NOTE ADULT - PROBLEM SELECTOR PLAN 1
chronic/recurrent, d/t OA; s/p arthrocentesis by Ortho; cont. pain control, RICE, PT WBAT; Pt. reports being told he would benefit from a knee replacement in the past, will help facilitate outpatient Ortho f/u

## 2021-06-04 NOTE — PROGRESS NOTE ADULT - ASSESSMENT
A/P:66y Male    1.  DM  - A1c 7.7%  - Home med: Jardiance 5mg  - Management is challenging d/t patient's ESRD, will likely need to be d/tracey on his home medications  - Pt instructed to eat smaller portion of carbs at home  - Please continue lantus 10 units at night  - Please increase lispro to 5 units before each meal.    - Please change lispro from moderate to low dose sliding scale four times daily with meals and at bedtime 65yo M with PMH of ESRD (TTS, via LUE fistula), DM2, HTN, Afib? on Eliquis, L knee OA who presented to [facility] ED with recurrent left knee effusion (recent drained at OSH) and pain, likely 2/2 known erosive osteoarthritis. S/p arthrocentesis with ortho on day of admission showing inflammatory process likely 2/2 OA. A1c was 7.7%. Pt initially with FSBG in 300s and has ranged 130-330s since admission, Endocrine consulted for DM management in ESRD.      1.  DM  - A1c 7.7%  - Fructosamine 398  - Home med: Jardiance 5mg  - Management is challenging d/t patient's ESRD, will likely need to be d/tracey on his home medications  - Pt instructed to eat smaller portion of carbs at home  - Please increase lantus to 12 units at night  - Please decrease lispro to 4 units before each meal  - Please continue low dose sliding scale four times daily with meals and at bedtime    Plan discussed with attending, Dr. Lopez, and relayed to the primary team. 65yo M with PMH of ESRD (TTS, via LUE fistula), DM2, HTN, Afib? on Eliquis, L knee OA who presented to [facility] ED with recurrent left knee effusion (recent drained at OSH) and pain, likely 2/2 known erosive osteoarthritis. S/p arthrocentesis with ortho on day of admission showing inflammatory process likely 2/2 OA. A1c was 7.7%. Pt initially with FSBG in 300s and has ranged 130-330s since admission, Endocrine consulted for DM management in ESRD.      1.  DM  - A1c 7.7%  - Fructosamine 398  - Home med: Jardiance 5mg  - Management is challenging d/t patient's ESRD, will likely need to be d/tracey on his home medications  - Pt instructed to eat smaller portion of carbs at home  - Please increase lantus to 12 units at night  - Please continue lispro 5 units before each meal  - Please continue low dose sliding scale four times daily with meals and at bedtime    Plan discussed with attending, Dr. Lopez, and relayed to the primary team.

## 2021-06-04 NOTE — PROGRESS NOTE ADULT - PROBLEM SELECTOR PLAN 2
ESRD (TTS, last session today) via LUE fistula however being prepped for peritoneal dialysis. euvolemic currently  - nephro consulted for HD, HD today ESRD (TTS, last session today) via LUE fistula however being prepped for peritoneal dialysis. euvolemic currently  - nephro consulted for HD,. last HD 6/3 ESRD (TTS, last session today) via LUE fistula however being prepped for peritoneal dialysis. euvolemic currently  - nephro consulted for HD,. last HD 6/3, next is 6/5

## 2021-06-04 NOTE — PROGRESS NOTE ADULT - PROBLEM SELECTOR PLAN 3
Hx of HTN; unable to recall any meds  - med rec in AM w/ [facility]   - based on MAR will continue hydral 100mg TID, Losartan 100mg and Nifedipine 90mg QD.  - also on torsemide 100mg daily, and imdur 30mg daily, per med rec. will hold for now pending further discussion with PCP/cardiologist

## 2021-06-04 NOTE — PROGRESS NOTE ADULT - PROBLEM SELECTOR PLAN 8
DVT: Eliquis, SCD  GI: none     FULL CODE DVT: Eliquis, SCD  GI: none     FULL CODE    Dispo: - PT eval- recommending [facility] pending further PT, pt would like to go home. pending home services

## 2021-06-04 NOTE — PROGRESS NOTE ADULT - PROBLEM SELECTOR PLAN 5
Previously on insulin however d/tracey outpt. FSG on admission 300, AG 14  - A1C 7.7   - started on lantus 15 and lispro 3 for 0.3units/kg   - endo consulted in setting of ?outpatient meds and if pt has good follow up   - c/w ISS  - continue to monitor Previously on insulin however d/tracey outpt. FSG on admission 300, AG 14  - on Tradjenta at home  - A1C 7.7   -lantus 10 and lispro 5 TID, endo following   - c/w ISS  - continue to monitor

## 2021-06-04 NOTE — PROGRESS NOTE ADULT - PROBLEM SELECTOR PLAN 3
HbA1c 7.7%; cont. basal-bolus insulin, diabetic diet, statin; monitor blood glucose; appreciate Endocrine recs

## 2021-06-04 NOTE — PROGRESS NOTE ADULT - SUBJECTIVE AND OBJECTIVE BOX
OVERNIGHT EVENTS:  As per nurse and patient, no o/n events, patient resting comfortably.    INTERVAL HPI:   Patient was seen and examined at bedside. No complaints at this time. Patient denies: fever, chills, dizziness, weakness, HA, Changes in vision, CP, palpitations, SOB, cough, N/V/D/C, dysuria, changes in bowel movements, LE edema. ROS otherwise negative.    VITAL SIGNS:  T(F): 98.5 (06-04-21 @ 09:39)  HR: 74 (06-04-21 @ 09:39)  BP: 137/62 (06-04-21 @ 09:39)  RR: 18 (06-04-21 @ 09:39)  SpO2: 98% (06-04-21 @ 09:39)  Wt(kg): --    PHYSICAL EXAM:    Constitutional: WDWN, NAD  HEENT: PERRL, EOMI, sclera non-icteric, neck supple, trachea midline, no masses, no JVD, MMM, good dentition  Respiratory: CTA b/l, good air entry b/l, no wheezing, no rhonchi, no rales, without accessory muscle use and no intercostal retractions  Cardiovascular: RRR, normal S1S2, no M/R/G  Gastrointestinal: soft, NTND, no masses palpable, BS normal  Extremities: Warm, well perfused, pulses equal bilateral upper and lower extremities, no edema, no clubbing  Neurological: AAOx3, CN Grossly intact  Skin: Normal temperature, warm, dry    MEDICATIONS  (STANDING):  apixaban 5 milliGRAM(s) Oral every 12 hours  atorvastatin 20 milliGRAM(s) Oral at bedtime  brimonidine 0.2% Ophthalmic Solution 1 Drop(s) Both EYES two times a day  carvedilol 25 milliGRAM(s) Oral every 12 hours  glucagon  Injectable 1 milliGRAM(s) IntraMuscular once  hydrALAZINE 100 milliGRAM(s) Oral every 8 hours  insulin glargine Injectable (LANTUS) 10 Unit(s) SubCutaneous at bedtime  insulin lispro (ADMELOG) corrective regimen sliding scale   SubCutaneous Before meals and at bedtime  insulin lispro Injectable (ADMELOG) 5 Unit(s) SubCutaneous three times a day before meals  ketotifen 0.025% Ophthalmic Solution 1 Drop(s) Both EYES at bedtime  losartan 100 milliGRAM(s) Oral every 24 hours  NIFEdipine XL 90 milliGRAM(s) Oral every 24 hours    MEDICATIONS  (PRN):  acetaminophen   Tablet .. 650 milliGRAM(s) Oral every 6 hours PRN Mild Pain (1 - 3)      Allergies    No Known Allergies    Intolerances        LABS:                        8.1    6.23  )-----------( 268      ( 04 Jun 2021 06:04 )             26.1     06-04    132<L>  |  94<L>  |  41<H>  ----------------------------<  187<H>  5.1   |  25  |  5.13<H>    Ca    8.8      04 Jun 2021 06:04  Phos  4.1     06-04  Mg     2.1     06-04              RADIOLOGY & ADDITIONAL TESTS:  Reviewed OVERNIGHT EVENTS:  As per nurse and patient, no o/n events, patient resting comfortably.    INTERVAL HPI:   Patient was seen and examined at bedside. Patient reports left knee pain, not improved Patient denies: fever, chills, dizziness, weakness, HA, Changes in vision, CP, palpitations, SOB, cough, N/V/D/C, dysuria, changes in bowel movements, LE edema. ROS otherwise negative.    VITAL SIGNS:  T(F): 98.5 (06-04-21 @ 09:39)  HR: 74 (06-04-21 @ 09:39)  BP: 137/62 (06-04-21 @ 09:39)  RR: 18 (06-04-21 @ 09:39)  SpO2: 98% (06-04-21 @ 09:39)  Wt(kg): --    PHYSICAL EXAM:    Constitutional: WDWN, NAD  HEENT: PERRL, EOMI, sclera non-icteric, neck supple, trachea midline, no masses, no JVD, MMM, good dentition  Respiratory: CTA b/l, good air entry b/l, no wheezing, no rhonchi, no rales, without accessory muscle use and no intercostal retractions  Cardiovascular: RRR, normal S1S2, no M/R/G  Gastrointestinal: soft, NTND, no masses palpable, BS normal  Extremities: Warm, well perfused, pulses equal bilateral upper and lower extremities, no edema, no clubbing  Neurological: AAOx3, CN Grossly intact  Skin: Normal temperature, warm, dry    MEDICATIONS  (STANDING):  apixaban 5 milliGRAM(s) Oral every 12 hours  atorvastatin 20 milliGRAM(s) Oral at bedtime  brimonidine 0.2% Ophthalmic Solution 1 Drop(s) Both EYES two times a day  carvedilol 25 milliGRAM(s) Oral every 12 hours  glucagon  Injectable 1 milliGRAM(s) IntraMuscular once  hydrALAZINE 100 milliGRAM(s) Oral every 8 hours  insulin glargine Injectable (LANTUS) 10 Unit(s) SubCutaneous at bedtime  insulin lispro (ADMELOG) corrective regimen sliding scale   SubCutaneous Before meals and at bedtime  insulin lispro Injectable (ADMELOG) 5 Unit(s) SubCutaneous three times a day before meals  ketotifen 0.025% Ophthalmic Solution 1 Drop(s) Both EYES at bedtime  losartan 100 milliGRAM(s) Oral every 24 hours  NIFEdipine XL 90 milliGRAM(s) Oral every 24 hours    MEDICATIONS  (PRN):  acetaminophen   Tablet .. 650 milliGRAM(s) Oral every 6 hours PRN Mild Pain (1 - 3)      Allergies    No Known Allergies    Intolerances        LABS:                        8.1    6.23  )-----------( 268      ( 04 Jun 2021 06:04 )             26.1     06-04    132<L>  |  94<L>  |  41<H>  ----------------------------<  187<H>  5.1   |  25  |  5.13<H>    Ca    8.8      04 Jun 2021 06:04  Phos  4.1     06-04  Mg     2.1     06-04              RADIOLOGY & ADDITIONAL TESTS:  Reviewed OVERNIGHT EVENTS:  As per nurse and patient, no o/n events, patient resting comfortably.    INTERVAL HPI:   Patient was seen and examined at bedside. Patient reports left knee pain, not improved with Tylenol but improved with Tramadol. Patient denies: fever, chills, dizziness, weakness, HA, Changes in vision, CP, palpitations, SOB, cough, N/V/D/C, dysuria, changes in bowel movements, LE edema. ROS otherwise negative.    VITAL SIGNS:  T(F): 98.5 (06-04-21 @ 09:39)  HR: 74 (06-04-21 @ 09:39)  BP: 137/62 (06-04-21 @ 09:39)  RR: 18 (06-04-21 @ 09:39)  SpO2: 98% (06-04-21 @ 09:39)  Wt(kg): --    PHYSICAL EXAM:      Constitutional: WDWN, NAD  HEENT: PERRL, EOMI, sclera non-icteric, neck supple, trachea midline, no masses, no JVD, MMM,  Respiratory: CTA b/l, good air entry b/l, no wheezing, no rhonchi, no rales, without accessory muscle use and no intercostal retractions  Cardiovascular: irregular rhythm, regular rate, normal S1S2, no M/R/G  Gastrointestinal: soft, NTND, no masses palpable, BS normal  Extremities: left knee- edematous, with prepatellar effusion, limited range of motion, nontender to palpation. Warm, well perfused, pulses equal bilateral upper and lower extremities, no edema, no clubbing  Neurological: AAOx3, CN Grossly intact  Skin: Normal temperature, warm, dry    MEDICATIONS  (STANDING):  apixaban 5 milliGRAM(s) Oral every 12 hours  atorvastatin 20 milliGRAM(s) Oral at bedtime  brimonidine 0.2% Ophthalmic Solution 1 Drop(s) Both EYES two times a day  carvedilol 25 milliGRAM(s) Oral every 12 hours  glucagon  Injectable 1 milliGRAM(s) IntraMuscular once  hydrALAZINE 100 milliGRAM(s) Oral every 8 hours  insulin glargine Injectable (LANTUS) 10 Unit(s) SubCutaneous at bedtime  insulin lispro (ADMELOG) corrective regimen sliding scale   SubCutaneous Before meals and at bedtime  insulin lispro Injectable (ADMELOG) 5 Unit(s) SubCutaneous three times a day before meals  ketotifen 0.025% Ophthalmic Solution 1 Drop(s) Both EYES at bedtime  losartan 100 milliGRAM(s) Oral every 24 hours  NIFEdipine XL 90 milliGRAM(s) Oral every 24 hours    MEDICATIONS  (PRN):  acetaminophen   Tablet .. 650 milliGRAM(s) Oral every 6 hours PRN Mild Pain (1 - 3)      Allergies    No Known Allergies    Intolerances        LABS:                        8.1    6.23  )-----------( 268      ( 04 Jun 2021 06:04 )             26.1     06-04    132<L>  |  94<L>  |  41<H>  ----------------------------<  187<H>  5.1   |  25  |  5.13<H>    Ca    8.8      04 Jun 2021 06:04  Phos  4.1     06-04  Mg     2.1     06-04              RADIOLOGY & ADDITIONAL TESTS:  Reviewed

## 2021-06-04 NOTE — PROGRESS NOTE ADULT - SUBJECTIVE AND OBJECTIVE BOX
Patient is a 66y old  Male who presents with a chief complaint of Knee effusion (2021 13:25)      INTERVAL HPI/OVERNIGHT EVENTS:    Pt. seen and examined earlier today  Pt. c/o L knee pain, somewhat improved, controlled w/ rx  Pt. denies F/C, CP, SOB, palpitations    Review of Systems: 12 point review of systems otherwise negative    MEDICATIONS  (STANDING):  apixaban 5 milliGRAM(s) Oral every 12 hours  atorvastatin 20 milliGRAM(s) Oral at bedtime  brimonidine 0.2% Ophthalmic Solution 1 Drop(s) Both EYES two times a day  carvedilol 25 milliGRAM(s) Oral every 12 hours  glucagon  Injectable 1 milliGRAM(s) IntraMuscular once  hydrALAZINE 100 milliGRAM(s) Oral every 8 hours  insulin glargine Injectable (LANTUS) 10 Unit(s) SubCutaneous at bedtime  insulin lispro (ADMELOG) corrective regimen sliding scale   SubCutaneous Before meals and at bedtime  insulin lispro Injectable (ADMELOG) 5 Unit(s) SubCutaneous three times a day before meals  ketotifen 0.025% Ophthalmic Solution 1 Drop(s) Both EYES at bedtime  losartan 100 milliGRAM(s) Oral every 24 hours  NIFEdipine XL 90 milliGRAM(s) Oral every 24 hours    MEDICATIONS  (PRN):  acetaminophen   Tablet .. 650 milliGRAM(s) Oral every 6 hours PRN Mild Pain (1 - 3)      Allergies    No Known Allergies    Intolerances          Vital Signs Last 24 Hrs  T(C): 36.9 (2021 09:39), Max: 37.3 (2021 16:16)  T(F): 98.5 (2021 09:39), Max: 99.2 (2021 16:16)  HR: 74 (2021 09:39) (74 - 84)  BP: 137/62 (2021 09:39) (135/67 - 169/70)  BP(mean): 103 (2021 21:20) (103 - 103)  RR: 18 (2021 09:39) (17 - 18)  SpO2: 98% (2021 09:39) (95% - 99%)  CAPILLARY BLOOD GLUCOSE      POCT Blood Glucose.: 163 mg/dL (2021 11:48)  POCT Blood Glucose.: 164 mg/dL (2021 08:37)  POCT Blood Glucose.: 185 mg/dL (2021 21:54)  POCT Blood Glucose.: 204 mg/dL (2021 17:17)      06-03 @ 07:01  -  06-04 @ 07:00  --------------------------------------------------------  IN: 0 mL / OUT: 3600 mL / NET: -3600 mL        Physical Exam:  (earlier today)  Daily     Daily Weight in k (2021 15:50)  General:  well-appearing in NAD, eating lunch  HEENT:  MMM  CV:  no JVD  Extremities:  +LUE AVF +L knee ACE wrap C/D/I  Skin:  WWP  Neuro:  AAOx3    LABS:                        8.1    6.23  )-----------( 268      ( 2021 06:04 )             26.1     06-04    132<L>  |  94<L>  |  41<H>  ----------------------------<  187<H>  5.1   |  25  |  5.13<H>    Ca    8.8      2021 06:04  Phos  4.1     06-04  Mg     2.1     06-04

## 2021-06-04 NOTE — PROGRESS NOTE ADULT - ASSESSMENT
per Internal Medicine    67 yo M with PMH of ESRD (TTS, last session today) via LUE fistula, T2DM, HTN, Afib? on Eliquis, L knee OA presents for recurrent Left knee effusion and pain, likely 2/2 known erosive osteoarthritis with WBC<50,000 on synovial analysis and pt afebrile- but also with significant RBCs, ?if Eliquis use contributing.        Problem/Plan - 1:  ·  Problem: Knee effusion, left.  Plan: Pt with hx of recurrent left knee effusion 2/2 OA s/p drainage multiple times; most recently at [place] 2 days ago. Presents with worsening swelling, pain and difficulty walking since then.   -Knee xray with Suprapatellar effusion appreciated. s/p arthrocentesis w/ WBC 38K, neutrophil <50%, unlikely septic, possible inflammatory process. No crystals seen. Effusion likely 2/2 OA, less likely gout/pseudogout, RA. In addition, with blood tinged fluid, RBC>60K; does not appear hemiarthrosis per ortho however pt on eliquis and may have contributed to quick re effusion? No hx of bleeding disorders. Pt with continued difficulty walking in ED; admitted for PT eval  - s/p arthrocentesis in ED   - f/up ortho recs  - consider outpt MRI for recurrent effusions   - PT eval pending   - pain control  - restart Eliquis as knee exam is stable.     Problem/Plan - 2:  ·  Problem: ESRD (end stage renal disease) on dialysis.  Plan: ESRD (TTS, last session today) via LUE fistula however being prepped for peritoneal dialysis. euvolemic currently  - nephro consulted for HD, HD today.     Problem/Plan - 3:  ·  Problem: HTN (hypertension).  Plan: Hx of HTN; unable to recall any meds  - med rec in AM w/ [facility]   - based on MAR will continue hydral 100mg TID, Losartan 100mg and Nifedipine 90mg QD.  - also on torsemide 100mg daily, and imdur 30mg daily, per med rec. will hold for now pending further discussion with PCP/cardiologist.     Problem/Plan - 4:  ·  Problem: Afib.  Plan: Hx of afib. currently NSR  - c/w home coreg 25mg BID   - c/w eliquis    #twave inversions: Patient with large twave inversions particularly in left precordial leads. Not isolated to anterior leads consistent with wellens and No current CP/SOB, SOLIS. Likely LVH with strain pattern vs possible apical HCM? No previous EKGs or echo to compare  - trop 0.45--> peaked at 0.42, but ck/ckmb wnl   - cards following, follow up recs   - echo: normal LV/RV systolic function. mild- moderate AS, mod TR/MR. pulm HTN , PASP 50mmgHg.     Problem/Plan - 5:  ·  Problem: Diabetes.  Plan: Previously on insulin however d/tracey outpt. FSG on admission 300, AG 14  - A1C 7.7   - started on lantus 15 and lispro 3 for 0.3units/kg   - endo consulted in setting of ?outpatient meds and if pt has good follow up   - c/w ISS  - continue to monitor.     Problem/Plan - 6:  Problem: Anemia. Plan: hgb 9.1 on admission likely 2/2 ESRD; no previous to compare.   - continue to monitor   - f/up iron studies  - active T/S.    Problem/Plan - 7:  ·  Problem: Nutrition, metabolism, and development symptoms.  Plan: F: none   E: replete prn   N: Renal, CC.     Problem/Plan - 8:  ·  Problem: Need for prophylactic measure.  Plan: DVT: Eliquis, SCD  GI: none     FULL CODE.

## 2021-06-04 NOTE — PROGRESS NOTE ADULT - SUBJECTIVE AND OBJECTIVE BOX
Incomplete Note    OVERNIGHT EVENTS:    SUBJECTIVE / INTERVAL HPI: Patient seen and examined at bedside. Denies f/c, n/v, HA, chest pain, SOB, abdominal pain, diarrhea, constipation, melena, hematochezia, hematuria, dysuria,    VITAL SIGNS:  Vital Signs Last 24 Hrs  T(C): 36.9 (04 Jun 2021 09:39), Max: 37.3 (03 Jun 2021 16:16)  T(F): 98.5 (04 Jun 2021 09:39), Max: 99.2 (03 Jun 2021 16:16)  HR: 74 (04 Jun 2021 09:39) (71 - 84)  BP: 137/62 (04 Jun 2021 09:39) (135/65 - 169/70)  BP(mean): 103 (03 Jun 2021 21:20) (103 - 103)  RR: 18 (04 Jun 2021 09:39) (17 - 18)  SpO2: 98% (04 Jun 2021 09:39) (95% - 99%)      06-03-21 @ 07:01  -  06-04-21 @ 07:00  --------------------------------------------------------  IN: 0 mL / OUT: 3600 mL / NET: -3600 mL        PHYSICAL EXAM:  General: NAD, Laying comfortably in bed  HEENT: NC/AT, anicteric sclera, MMM  Neck: supple  Cardiovascular: +S1/S2, RRR, No murmurs, rubs, gallops  Respiratory: CTA B/L, no W/R/R  Gastrointestinal: soft, NT/ND, +BSx4  Extremities: WWP, no edema, clubbing or cyanosis  Vascular: 2+ radial, DP/PT pulses B/L  Neurological: AAOx3, no focal deficits    MEDICATIONS  (STANDING):  apixaban 5 milliGRAM(s) Oral every 12 hours  atorvastatin 20 milliGRAM(s) Oral at bedtime  brimonidine 0.2% Ophthalmic Solution 1 Drop(s) Both EYES two times a day  carvedilol 25 milliGRAM(s) Oral every 12 hours  glucagon  Injectable 1 milliGRAM(s) IntraMuscular once  hydrALAZINE 100 milliGRAM(s) Oral every 8 hours  insulin glargine Injectable (LANTUS) 10 Unit(s) SubCutaneous at bedtime  insulin lispro (ADMELOG) corrective regimen sliding scale   SubCutaneous Before meals and at bedtime  insulin lispro Injectable (ADMELOG) 5 Unit(s) SubCutaneous three times a day before meals  ketotifen 0.025% Ophthalmic Solution 1 Drop(s) Both EYES at bedtime  losartan 100 milliGRAM(s) Oral every 24 hours  NIFEdipine XL 90 milliGRAM(s) Oral every 24 hours    MEDICATIONS  (PRN):  acetaminophen   Tablet .. 650 milliGRAM(s) Oral every 6 hours PRN Mild Pain (1 - 3)    Allergies    No Known Allergies    Intolerances        LABS:                        8.1    6.23  )-----------( 268      ( 04 Jun 2021 06:04 )             26.1     06-04    132<L>  |  94<L>  |  41<H>  ----------------------------<  187<H>  5.1   |  25  |  5.13<H>    Ca    8.8      04 Jun 2021 06:04  Phos  4.1     06-04  Mg     2.1     06-04          CAPILLARY BLOOD GLUCOSE      POCT Blood Glucose.: 164 mg/dL (04 Jun 2021 08:37)          RADIOLOGY & ADDITIONAL TESTS: Reviewed. SUBJECTIVE / INTERVAL HPI: Patient seen and examined at bedside. Patient states that his knee is feeling better today and he was able to tolerate walking around earlier in the day. Denies f/c, n/v, HA, chest pain, SOB, abdominal pain, diarrhea, constipation, melena, hematochezia, hematuria, dysuria.    VITAL SIGNS:  Vital Signs Last 24 Hrs  T(C): 36.9 (04 Jun 2021 09:39), Max: 37.3 (03 Jun 2021 16:16)  T(F): 98.5 (04 Jun 2021 09:39), Max: 99.2 (03 Jun 2021 16:16)  HR: 74 (04 Jun 2021 09:39) (71 - 84)  BP: 137/62 (04 Jun 2021 09:39) (135/65 - 169/70)  BP(mean): 103 (03 Jun 2021 21:20) (103 - 103)  RR: 18 (04 Jun 2021 09:39) (17 - 18)  SpO2: 98% (04 Jun 2021 09:39) (95% - 99%)      06-03-21 @ 07:01  -  06-04-21 @ 07:00  --------------------------------------------------------  IN: 0 mL / OUT: 3600 mL / NET: -3600 mL    PHYSICAL EXAM:  General: NAD, Laying comfortably in bed  HEENT: NC/AT, anicteric sclera, MMM  Cardiovascular: +S1/S2, RRR, No murmurs, rubs, gallops  Respiratory: CTA B/L, no W/R/R  Gastrointestinal: soft, NT/ND, +BSx4  Extremities: WWP, no edema, clubbing or cyanosis, L knee ACE wrapped and swollen compared to the R knee  Vascular: 2+ radial, DP/PT pulses B/L  Neurological: AAOx3, no focal deficits    MEDICATIONS  (STANDING):  apixaban 5 milliGRAM(s) Oral every 12 hours  atorvastatin 20 milliGRAM(s) Oral at bedtime  brimonidine 0.2% Ophthalmic Solution 1 Drop(s) Both EYES two times a day  carvedilol 25 milliGRAM(s) Oral every 12 hours  glucagon  Injectable 1 milliGRAM(s) IntraMuscular once  hydrALAZINE 100 milliGRAM(s) Oral every 8 hours  insulin glargine Injectable (LANTUS) 10 Unit(s) SubCutaneous at bedtime  insulin lispro (ADMELOG) corrective regimen sliding scale   SubCutaneous Before meals and at bedtime  insulin lispro Injectable (ADMELOG) 5 Unit(s) SubCutaneous three times a day before meals  ketotifen 0.025% Ophthalmic Solution 1 Drop(s) Both EYES at bedtime  losartan 100 milliGRAM(s) Oral every 24 hours  NIFEdipine XL 90 milliGRAM(s) Oral every 24 hours    MEDICATIONS  (PRN):  acetaminophen   Tablet .. 650 milliGRAM(s) Oral every 6 hours PRN Mild Pain (1 - 3)    Allergies    No Known Allergies    Intolerances        LABS:                        8.1    6.23  )-----------( 268      ( 04 Jun 2021 06:04 )             26.1     06-04    132<L>  |  94<L>  |  41<H>  ----------------------------<  187<H>  5.1   |  25  |  5.13<H>    Ca    8.8      04 Jun 2021 06:04  Phos  4.1     06-04  Mg     2.1     06-04          CAPILLARY BLOOD GLUCOSE      POCT Blood Glucose.: 164 mg/dL (04 Jun 2021 08:37)          RADIOLOGY & ADDITIONAL TESTS: Reviewed.

## 2021-06-04 NOTE — PROGRESS NOTE ADULT - TIME BILLING
Dispo: anticipate d/c home later today, pending PT eval
Elevated glucoses
as noted above
Dispo: anticipate d/c home tomorrow after HD  Pt. offered but refused [facility] placement  I-STOP reviewed, I will write rx for Ultram PRN

## 2021-06-04 NOTE — PROGRESS NOTE ADULT - PROBLEM SELECTOR PLAN 1
Pt with hx of recurrent left knee effusion 2/2 OA s/p drainage multiple times; most recently at [place] 2 days ago. Presents with worsening swelling, pain and difficulty walking since then.   -Knee xray with Suprapatellar effusion appreciated. s/p arthrocentesis w/ WBC 38K, neutrophil <50%, unlikely septic, possible inflammatory process. No crystals seen. Effusion likely 2/2 OA, less likely gout/pseudogout, RA. In addition, with blood tinged fluid, RBC>60K; does not appear hemiarthrosis per ortho however pt on eliquis and may have contributed to quick re effusion? No hx of bleeding disorders. Pt with continued difficulty walking in ED; admitted for PT eval  - s/p arthrocentesis in ED   - f/up ortho recs  - consider outpt MRI for recurrent effusions   - PT eval pending   - pain control  - restart Eliquis as knee exam is stable Pt with hx of recurrent left knee effusion 2/2 OA s/p drainage multiple times; most recently at [place] 2 days ago. Presents with worsening swelling, pain and difficulty walking since then.   -Knee xray with Suprapatellar effusion appreciated. s/p arthrocentesis w/ WBC 38K, neutrophil <50%, unlikely septic, possible inflammatory process. No crystals seen. Effusion likely 2/2 OA, less likely gout/pseudogout, RA. In addition, with blood tinged fluid, RBC>60K; does not appear hemiarthrosis per ortho however pt on eliquis and may have contributed to quick re effusion? No hx of bleeding disorders. Pt with continued difficulty walking in ED; admitted for PT eval  - s/p arthrocentesis in ED   - f/up ortho recs  - consider outpt MRI for recurrent effusions   - PT eval- recommending AALIYAH pending further PT   - pain control  - restart Eliquis as knee exam is stable Pt with hx of recurrent left knee effusion 2/2 OA s/p drainage multiple times; most recently at [place] 2 days ago. Presents with worsening swelling, pain and difficulty walking since then.   -Knee xray with Suprapatellar effusion appreciated.  -  s/p arthrocentesis w/ WBC 38K, neutrophil <50%, unlikely septic, possible inflammatory process. No crystals seen. Effusion likely 2/2 OA, less likely gout/pseudogout, RA. In addition, with blood tinged fluid, RBC>60K; does not appear hemiarthrosis per ortho however pt on eliquis and may have contributed to quick re effusion? No hx of bleeding disorders. Pt with continued difficulty walking in ED; admitted for PT eval  - s/p arthrocentesis in ED   - f/up ortho recs  - consider outpt MRI for recurrent effusions   - PT eval- recommending AALIYAH pending further PT, pt would like to go home. pending home services   - pain control,   - restart Eliquis as knee exam is stable

## 2021-06-04 NOTE — PROGRESS NOTE ADULT - PROBLEM SELECTOR PLAN 6
hgb 9.1 on admission likely 2/2 ESRD; no previous to compare.   - continue to monitor   - f/up iron studies  - active T/S hgb 9.1 on admission likely 2/2 ESRD; no previous to compare.   - continue to monitor   - EPO with HD, consider IV iron if stays

## 2021-06-04 NOTE — PROGRESS NOTE ADULT - ASSESSMENT
A 67yo M with PMH of ESRD (TTS, last session today) via LUE fistula, T2DM, HTN, Afib? on Eliquis, L knee OA presents for recurrent Left knee effusion and pain, likely 2/2 known erosive osteoarthritis with WBC<50,000 on synovial analysis and pt afebrile- but also with significant RBCs, ?if Eliquis use contributing.

## 2021-06-04 NOTE — PROGRESS NOTE ADULT - SUBJECTIVE AND OBJECTIVE BOX
Physical Medicine and Rehabilitation Progress Note:    Patient is a 66y old  Male who presents with a chief complaint of Knee effusion (03 Jun 2021 13:25)      HPI:  A 65yo M with PMH of ESRD (TTS, last session today) via LUE fistula, T2DM, HTN, Afib? on Eliquis, L knee OA presents for recurrent Left knee effusion and pain. Patient says for the past two years he has had [id number] left knee effusion  2/2 OA s/p drainage multiple times; most recently at [place] 2 days ago. Since then pt has had increased swelling, pain and difficulty walking. He denies any fevers/chills, CP, SOB, abd pain, nausea/vomiting, diarhea. He has diffculty bearing weight on LLE 2/2 pain, and limited ROM.     In ED VS T98.9, HR 78, /65, RR 18 and SpO2 99% on RA. Labs s/f ESR 97, Hgb 9.1, Cr 4.47, , , . LLE doppler neg for DVT. L Knee xray with Suprapatellar effusion appreciated. Ortho consulted in ED s/p arthrocentesis; analysis not c/w septic joint. Pt with continued difficulty walking in ED; admitted for PT eval.  (01 Jun 2021 23:04)                            8.1    6.23  )-----------( 268      ( 04 Jun 2021 06:04 )             26.1       06-04    132<L>  |  94<L>  |  41<H>  ----------------------------<  187<H>  5.1   |  25  |  5.13<H>    Ca    8.8      04 Jun 2021 06:04  Phos  4.1     06-04  Mg     2.1     06-04      Vital Signs Last 24 Hrs  T(C): 36.9 (04 Jun 2021 09:39), Max: 37.3 (03 Jun 2021 16:16)  T(F): 98.5 (04 Jun 2021 09:39), Max: 99.2 (03 Jun 2021 16:16)  HR: 74 (04 Jun 2021 09:39) (74 - 84)  BP: 137/62 (04 Jun 2021 09:39) (135/67 - 169/70)  BP(mean): 103 (03 Jun 2021 21:20) (103 - 103)  RR: 18 (04 Jun 2021 09:39) (17 - 18)  SpO2: 98% (04 Jun 2021 09:39) (95% - 99%)    MEDICATIONS  (STANDING):  apixaban 5 milliGRAM(s) Oral every 12 hours  atorvastatin 20 milliGRAM(s) Oral at bedtime  brimonidine 0.2% Ophthalmic Solution 1 Drop(s) Both EYES two times a day  carvedilol 25 milliGRAM(s) Oral every 12 hours  glucagon  Injectable 1 milliGRAM(s) IntraMuscular once  hydrALAZINE 100 milliGRAM(s) Oral every 8 hours  insulin glargine Injectable (LANTUS) 10 Unit(s) SubCutaneous at bedtime  insulin lispro (ADMELOG) corrective regimen sliding scale   SubCutaneous Before meals and at bedtime  insulin lispro Injectable (ADMELOG) 5 Unit(s) SubCutaneous three times a day before meals  ketotifen 0.025% Ophthalmic Solution 1 Drop(s) Both EYES at bedtime  losartan 100 milliGRAM(s) Oral every 24 hours  NIFEdipine XL 90 milliGRAM(s) Oral every 24 hours    MEDICATIONS  (PRN):  acetaminophen   Tablet .. 650 milliGRAM(s) Oral every 6 hours PRN Mild Pain (1 - 3)    Currently Undergoing Physical/ Occupational Therapy at bedside.    Functional Status Assessment:   6/3/2021      Previous Level of Function:     · Ambulation Skills	independent; needs device  · Transfer Skills	independent; needs device  · ADL Skills	needs device  · Additional Comments	Pt lives with family in an apt with 4 flights walk up. Prior to admission, pt ambulated with SC, pt also has rolling walker at home. Pt denies recent fall.    Cognitive Status Examination:   · Orientation	oriented to person, place, time and situation  · Level of Consciousness	alert  · Follows Commands and Answers Questions	100% of the time; able to follow single-step instructions; able to follow multistep instructions  · Personal Safety and Judgment	intact    Range of Motion Exam:   · Active Range of Motion Examination	AROM WFL through out except L knee 10-80 degrees AROM    Manual Muscle Testing:   · Manual Muscle Testing Results	b/l UEs >3+/5 grossly, b/L LEs >3+/5 grossly    Bed Mobility: Scooting/Bridging:     · Level of [place]	supervision    Bed Mobility: Sit to Supine:     · Level of [place]	supervision  · Physical Assist/Nonphysical Assist	supervision    Bed Mobility: Supine to Sit:     · Level of [place]	supervision  · Physical Assist/Nonphysical Assist	supervision    Bed Mobility Analysis:     · Bed Mobility Limitations	decreased ability to use arms for pushing/pulling; decreased ability to use legs for bridging/pushing; impaired ability to control trunk for mobility  · Impairments Contributing to Impaired Bed Mobility	impaired balance; decreased strength; pain; decreased ROM    Transfer: Sit to Stand:     · Level of [place]	minimum assist (75% patients effort); moderate assist (50% patients effort)  · Physical Assist/Nonphysical Assist	1 person assist  · Weight-Bearing Restrictions	weight-bearing as tolerated  · Assistive Device	straight cane    Transfer: Stand to Sit:     · Level of [place]	minimum assist (75% patients effort)  · Physical Assist/Nonphysical Assist	1 person assist; verbal cues  · Weight-Bearing Restrictions	weight-bearing as tolerated  · Assistive Device	straight cane    Sit/Stand Transfer Safety Analysis:     · Impairments Contributing to Impaired Transfers	impaired balance; decreased strength; decreased ROM; pain    Gait Skills:     · Level of [place]	minimum assist (75% patients effort); moderate assist (50% patients effort)  · Physical Assist/Nonphysical Assist	1 person assist  · Assistive Device	straight cane; SC + HHA  · Gait Distance	15 feet    Gait Analysis:     · Gait Pattern Used	3-point gait  · Gait Deviations Noted	decreased cynthia; increased time in double stance; decreased step length; unsteady, antalgic gait, attempt to reach for external support with other hand during ambulation, decreased heel strike and knee flexion on LLE during ambulation.  · Impairments Contributing to Gait Deviations	impaired balance; pain; decreased ROM; unable to ambulate further distance due to 10/10 L knee pain    Stair Negotiation:     · Level of [place]	unable to perform    Balance Skills Assessment:     · Sitting Balance: Static	good minus  · Sitting Balance: Dynamic	fair plus  · Sit-to-Stand Balance	poor plus  · Standing Balance: Static	fair minus  · Standing Balance: Dynamic	poor plus    Sensory Examination:   Sensory Examination:    Light Touch Sensation:   · Left LE	within normal limits  · Right LE	within normal limits      Treatment Location:   · Comments	heel slide x 5 trial LLE    Clinical Impressions:   · Criteria for Skilled Therapeutic Interventions	impairments found; functional limitations in following categories; rehab potential; therapy frequency; anticipated discharge recommendation  · Impairments Found (describe specific impairments)	aerobic capacity/endurance; muscle strength; gross motor; gait, locomotion, and balance; ROM  · Functional Limitations in Following Categories (describe specific limitations)	self-care; home management; community/leisure  · Rehab Potential	good, to achieve stated therapy goals  · Therapy Frequency	2-3x/week        PM&R Impression: as above    Current Disposition Plan Recommendations:    subacute rehab placement

## 2021-06-05 ENCOUNTER — TRANSCRIPTION ENCOUNTER (OUTPATIENT)
Age: 66
End: 2021-06-05

## 2021-06-05 VITALS — HEART RATE: 84 BPM | DIASTOLIC BLOOD PRESSURE: 84 MMHG | SYSTOLIC BLOOD PRESSURE: 176 MMHG

## 2021-06-05 LAB
ANION GAP SERPL CALC-SCNC: 15 MMOL/L — SIGNIFICANT CHANGE UP (ref 5–17)
BUN SERPL-MCNC: 63 MG/DL — HIGH (ref 7–23)
CALCIUM SERPL-MCNC: 9.3 MG/DL — SIGNIFICANT CHANGE UP (ref 8.4–10.5)
CHLORIDE SERPL-SCNC: 93 MMOL/L — LOW (ref 96–108)
CO2 SERPL-SCNC: 24 MMOL/L — SIGNIFICANT CHANGE UP (ref 22–31)
CREAT SERPL-MCNC: 6.47 MG/DL — HIGH (ref 0.5–1.3)
GLUCOSE BLDC GLUCOMTR-MCNC: 135 MG/DL — HIGH (ref 70–99)
GLUCOSE BLDC GLUCOMTR-MCNC: 71 MG/DL — SIGNIFICANT CHANGE UP (ref 70–99)
GLUCOSE BLDC GLUCOMTR-MCNC: 75 MG/DL — SIGNIFICANT CHANGE UP (ref 70–99)
GLUCOSE SERPL-MCNC: 68 MG/DL — LOW (ref 70–99)
HCT VFR BLD CALC: 25.6 % — LOW (ref 39–50)
HGB BLD-MCNC: 8 G/DL — LOW (ref 13–17)
MAGNESIUM SERPL-MCNC: 2.2 MG/DL — SIGNIFICANT CHANGE UP (ref 1.6–2.6)
MCHC RBC-ENTMCNC: 26.5 PG — LOW (ref 27–34)
MCHC RBC-ENTMCNC: 31.3 GM/DL — LOW (ref 32–36)
MCV RBC AUTO: 84.8 FL — SIGNIFICANT CHANGE UP (ref 80–100)
NRBC # BLD: 0 /100 WBCS — SIGNIFICANT CHANGE UP (ref 0–0)
PHOSPHATE SERPL-MCNC: 4.5 MG/DL — SIGNIFICANT CHANGE UP (ref 2.5–4.5)
PLATELET # BLD AUTO: 279 K/UL — SIGNIFICANT CHANGE UP (ref 150–400)
POTASSIUM SERPL-MCNC: 5.2 MMOL/L — SIGNIFICANT CHANGE UP (ref 3.5–5.3)
POTASSIUM SERPL-SCNC: 5.2 MMOL/L — SIGNIFICANT CHANGE UP (ref 3.5–5.3)
RBC # BLD: 3.02 M/UL — LOW (ref 4.2–5.8)
RBC # FLD: 15.7 % — HIGH (ref 10.3–14.5)
SODIUM SERPL-SCNC: 132 MMOL/L — LOW (ref 135–145)
WBC # BLD: 6.14 K/UL — SIGNIFICANT CHANGE UP (ref 3.8–10.5)
WBC # FLD AUTO: 6.14 K/UL — SIGNIFICANT CHANGE UP (ref 3.8–10.5)

## 2021-06-05 PROCEDURE — 82962 GLUCOSE BLOOD TEST: CPT

## 2021-06-05 PROCEDURE — 85027 COMPLETE CBC AUTOMATED: CPT

## 2021-06-05 PROCEDURE — 83735 ASSAY OF MAGNESIUM: CPT

## 2021-06-05 PROCEDURE — 36415 COLL VENOUS BLD VENIPUNCTURE: CPT

## 2021-06-05 PROCEDURE — 86803 HEPATITIS C AB TEST: CPT

## 2021-06-05 PROCEDURE — 97110 THERAPEUTIC EXERCISES: CPT

## 2021-06-05 PROCEDURE — 83540 ASSAY OF IRON: CPT

## 2021-06-05 PROCEDURE — 82728 ASSAY OF FERRITIN: CPT

## 2021-06-05 PROCEDURE — 90935 HEMODIALYSIS ONE EVALUATION: CPT

## 2021-06-05 PROCEDURE — 89051 BODY FLUID CELL COUNT: CPT

## 2021-06-05 PROCEDURE — 82985 ASSAY OF GLYCATED PROTEIN: CPT

## 2021-06-05 PROCEDURE — 87635 SARS-COV-2 COVID-19 AMP PRB: CPT

## 2021-06-05 PROCEDURE — 87075 CULTR BACTERIA EXCEPT BLOOD: CPT

## 2021-06-05 PROCEDURE — 82553 CREATINE MB FRACTION: CPT

## 2021-06-05 PROCEDURE — 84550 ASSAY OF BLOOD/URIC ACID: CPT

## 2021-06-05 PROCEDURE — 83550 IRON BINDING TEST: CPT

## 2021-06-05 PROCEDURE — 87205 SMEAR GRAM STAIN: CPT

## 2021-06-05 PROCEDURE — 85730 THROMBOPLASTIN TIME PARTIAL: CPT

## 2021-06-05 PROCEDURE — 73562 X-RAY EXAM OF KNEE 3: CPT

## 2021-06-05 PROCEDURE — 80061 LIPID PANEL: CPT

## 2021-06-05 PROCEDURE — 84443 ASSAY THYROID STIM HORMONE: CPT

## 2021-06-05 PROCEDURE — 82945 GLUCOSE OTHER FLUID: CPT

## 2021-06-05 PROCEDURE — 87070 CULTURE OTHR SPECIMN AEROBIC: CPT

## 2021-06-05 PROCEDURE — 84484 ASSAY OF TROPONIN QUANT: CPT

## 2021-06-05 PROCEDURE — 99239 HOSP IP/OBS DSCHRG MGMT >30: CPT | Mod: GC

## 2021-06-05 PROCEDURE — 97162 PT EVAL MOD COMPLEX 30 MIN: CPT

## 2021-06-05 PROCEDURE — 80048 BASIC METABOLIC PNL TOTAL CA: CPT

## 2021-06-05 PROCEDURE — 85652 RBC SED RATE AUTOMATED: CPT

## 2021-06-05 PROCEDURE — 89060 EXAM SYNOVIAL FLUID CRYSTALS: CPT

## 2021-06-05 PROCEDURE — 80053 COMPREHEN METABOLIC PANEL: CPT

## 2021-06-05 PROCEDURE — 84100 ASSAY OF PHOSPHORUS: CPT

## 2021-06-05 PROCEDURE — 86769 SARS-COV-2 COVID-19 ANTIBODY: CPT

## 2021-06-05 PROCEDURE — 93971 EXTREMITY STUDY: CPT

## 2021-06-05 PROCEDURE — 82550 ASSAY OF CK (CPK): CPT

## 2021-06-05 PROCEDURE — 85025 COMPLETE CBC W/AUTO DIFF WBC: CPT

## 2021-06-05 PROCEDURE — 83036 HEMOGLOBIN GLYCOSYLATED A1C: CPT

## 2021-06-05 PROCEDURE — 85610 PROTHROMBIN TIME: CPT

## 2021-06-05 PROCEDURE — 97116 GAIT TRAINING THERAPY: CPT

## 2021-06-05 PROCEDURE — 99285 EMERGENCY DEPT VISIT HI MDM: CPT

## 2021-06-05 PROCEDURE — 93306 TTE W/DOPPLER COMPLETE: CPT

## 2021-06-05 PROCEDURE — 84157 ASSAY OF PROTEIN OTHER: CPT

## 2021-06-05 PROCEDURE — 86140 C-REACTIVE PROTEIN: CPT

## 2021-06-05 RX ORDER — ONDANSETRON 8 MG/1
4 TABLET, FILM COATED ORAL ONCE
Refills: 0 | Status: COMPLETED | OUTPATIENT
Start: 2021-06-05 | End: 2021-06-05

## 2021-06-05 RX ORDER — ERYTHROPOIETIN 10000 [IU]/ML
4000 INJECTION, SOLUTION INTRAVENOUS; SUBCUTANEOUS ONCE
Refills: 0 | Status: DISCONTINUED | OUTPATIENT
Start: 2021-06-05 | End: 2021-06-05

## 2021-06-05 RX ORDER — APIXABAN 2.5 MG/1
1 TABLET, FILM COATED ORAL
Qty: 30 | Refills: 0
Start: 2021-06-05

## 2021-06-05 RX ORDER — LINAGLIPTIN 5 MG/1
1 TABLET, FILM COATED ORAL
Qty: 30 | Refills: 0
Start: 2021-06-05

## 2021-06-05 RX ORDER — ISOSORBIDE MONONITRATE 60 MG/1
1 TABLET, EXTENDED RELEASE ORAL
Qty: 0 | Refills: 0 | DISCHARGE

## 2021-06-05 RX ORDER — LOSARTAN POTASSIUM 100 MG/1
1 TABLET, FILM COATED ORAL
Qty: 30 | Refills: 0
Start: 2021-06-05

## 2021-06-05 RX ORDER — ERYTHROPOIETIN 10000 [IU]/ML
8000 INJECTION, SOLUTION INTRAVENOUS; SUBCUTANEOUS ONCE
Refills: 0 | Status: COMPLETED | OUTPATIENT
Start: 2021-06-05 | End: 2021-06-05

## 2021-06-05 RX ORDER — ATORVASTATIN CALCIUM 80 MG/1
1 TABLET, FILM COATED ORAL
Qty: 30 | Refills: 0
Start: 2021-06-05

## 2021-06-05 RX ORDER — HYDRALAZINE HCL 50 MG
1 TABLET ORAL
Qty: 0 | Refills: 0 | DISCHARGE

## 2021-06-05 RX ORDER — HYDRALAZINE HCL 50 MG
1 TABLET ORAL
Qty: 30 | Refills: 0
Start: 2021-06-05

## 2021-06-05 RX ORDER — ATORVASTATIN CALCIUM 80 MG/1
1 TABLET, FILM COATED ORAL
Qty: 0 | Refills: 0 | DISCHARGE

## 2021-06-05 RX ORDER — ACETAMINOPHEN 500 MG
2 TABLET ORAL
Qty: 0 | Refills: 0 | DISCHARGE
Start: 2021-06-05

## 2021-06-05 RX ORDER — LOSARTAN POTASSIUM 100 MG/1
1 TABLET, FILM COATED ORAL
Qty: 0 | Refills: 0 | DISCHARGE

## 2021-06-05 RX ORDER — APIXABAN 2.5 MG/1
1 TABLET, FILM COATED ORAL
Qty: 0 | Refills: 0 | DISCHARGE

## 2021-06-05 RX ORDER — CARVEDILOL PHOSPHATE 80 MG/1
1 CAPSULE, EXTENDED RELEASE ORAL
Qty: 0 | Refills: 0 | DISCHARGE

## 2021-06-05 RX ORDER — LINAGLIPTIN 5 MG/1
1 TABLET, FILM COATED ORAL
Qty: 0 | Refills: 0 | DISCHARGE

## 2021-06-05 RX ORDER — NIFEDIPINE 30 MG
1 TABLET, EXTENDED RELEASE 24 HR ORAL
Qty: 30 | Refills: 0
Start: 2021-06-05

## 2021-06-05 RX ORDER — NIFEDIPINE 30 MG
1 TABLET, EXTENDED RELEASE 24 HR ORAL
Qty: 0 | Refills: 0 | DISCHARGE

## 2021-06-05 RX ORDER — CARVEDILOL PHOSPHATE 80 MG/1
1 CAPSULE, EXTENDED RELEASE ORAL
Qty: 30 | Refills: 0
Start: 2021-06-05

## 2021-06-05 RX ORDER — TRAMADOL HYDROCHLORIDE 50 MG/1
1 TABLET ORAL
Qty: 8 | Refills: 0
Start: 2021-06-05 | End: 2021-06-08

## 2021-06-05 RX ORDER — INSULIN GLARGINE 100 [IU]/ML
8 INJECTION, SOLUTION SUBCUTANEOUS AT BEDTIME
Refills: 0 | Status: DISCONTINUED | OUTPATIENT
Start: 2021-06-05 | End: 2021-06-05

## 2021-06-05 RX ORDER — TRAMADOL HYDROCHLORIDE 50 MG/1
0.5 TABLET ORAL
Qty: 0 | Refills: 0 | DISCHARGE
Start: 2021-06-05

## 2021-06-05 RX ADMIN — APIXABAN 5 MILLIGRAM(S): 2.5 TABLET, FILM COATED ORAL at 14:22

## 2021-06-05 RX ADMIN — Medication 90 MILLIGRAM(S): at 06:22

## 2021-06-05 RX ADMIN — ERYTHROPOIETIN 8000 UNIT(S): 10000 INJECTION, SOLUTION INTRAVENOUS; SUBCUTANEOUS at 10:20

## 2021-06-05 RX ADMIN — Medication 5 UNIT(S): at 07:30

## 2021-06-05 RX ADMIN — Medication 100 MILLIGRAM(S): at 06:21

## 2021-06-05 RX ADMIN — LOSARTAN POTASSIUM 100 MILLIGRAM(S): 100 TABLET, FILM COATED ORAL at 06:22

## 2021-06-05 RX ADMIN — ONDANSETRON 4 MILLIGRAM(S): 8 TABLET, FILM COATED ORAL at 07:08

## 2021-06-05 RX ADMIN — Medication 100 MILLIGRAM(S): at 14:22

## 2021-06-05 RX ADMIN — BRIMONIDINE TARTRATE 1 DROP(S): 2 SOLUTION/ DROPS OPHTHALMIC at 06:22

## 2021-06-05 NOTE — PROGRESS NOTE ADULT - PROBLEM SELECTOR PLAN 4
Hx of afib. currently NSR  - c/w home coreg 25mg BID   - c/w eliquis    #twave inversions: Patient with large twave inversions particularly in left precordial leads. Not isolated to anterior leads consistent with wellens and No current CP/SOB, SOLIS. Likely LVH with strain pattern vs possible apical HCM? No previous EKGs or echo to compare  - trop 0.45--> peaked at 0.42, but ck/ckmb wnl   - cards following, follow up recs   - echo: normal LV/RV systolic function. mild- moderate AS, mod TR/MR. pulm HTN , PASP 50mmgHg Hx of afib. currently NSR  - c/w home coreg 25mg BID   - c/w eliquis    #twave inversions   Patient with large twave inversions particularly in left precordial leads. Not isolated to anterior leads consistent with wellens and No current CP/SOB, SOLIS. Likely LVH with strain pattern vs possible apical HCM? No previous EKGs or echo to compare  - trop 0.45--> peaked at 0.42, but ck/ckmb wnl   - cards following, ,likely repolarization on EKG, from LVH - - No further cardiac work up necessary  - Have patient follow up with his cardiologist for further management  - echo: normal LV/RV systolic function. mild- moderate AS, mod TR/MR. pulm HTN , PASP 50mmgHg.

## 2021-06-05 NOTE — PROGRESS NOTE ADULT - PROBLEM SELECTOR PROBLEM 1
Knee effusion, left

## 2021-06-05 NOTE — PROGRESS NOTE ADULT - PROBLEM SELECTOR PLAN 1
Pt with hx of recurrent left knee effusion 2/2 OA s/p drainage multiple times; most recently at [place] 2 days ago. Presents with worsening swelling, pain and difficulty walking since then.   -Knee xray with Suprapatellar effusion appreciated.  -  s/p arthrocentesis w/ WBC 38K, neutrophil <50%, unlikely septic, possible inflammatory process. No crystals seen. Effusion likely 2/2 OA, less likely gout/pseudogout, RA. In addition, with blood tinged fluid, RBC>60K; does not appear hemiarthrosis per ortho however pt on eliquis and may have contributed to quick re effusion? No hx of bleeding disorders. Pt with continued difficulty walking in ED; admitted for PT eval  - s/p arthrocentesis in ED   - f/up ortho recs  - consider outpt MRI for recurrent effusions   - PT eval- recommending AALIYAH pending further PT, pt would like to go home. pending home services   - pain control,   - restart Eliquis as knee exam is stable Pt with hx of recurrent left knee effusion 2/2 OA s/p drainage multiple times; most recently at [place] 2 days ago. Presents with worsening swelling, pain and difficulty walking since then.   -Knee xray with Suprapatellar effusion appreciated. s/p arthrocentesis w/ WBC 38K, neutrophil <50%, unlikely septic, possible inflammatory process. No crystals seen. Effusion likely 2/2 OA, less likely gout/pseudogout, RA. In addition, with blood tinged fluid, RBC>60K; does not appear hemiarthrosis per ortho however pt on eliquis and may have contributed to quick re effusion? No hx of bleeding disorders. Pt with continued difficulty walking in ED; admitted for PT eval  - s/p arthrocentesis in ED   - follow up with orthopedic surgery at outpatient, discuss surgical interventions   - consider outpt MRI for recurrent effusions   - PT eval recommended AALIYAH but pt declined and wants to go home   - pain control, tramadol sent to pharmacy

## 2021-06-05 NOTE — PROGRESS NOTE ADULT - PROBLEM SELECTOR PROBLEM 7
Anemia of renal disease
Nutrition, metabolism, and development symptoms
Anemia of renal disease
Nutrition, metabolism, and development symptoms

## 2021-06-05 NOTE — PROGRESS NOTE ADULT - SUBJECTIVE AND OBJECTIVE BOX
OVERNIGHT EVENTS:  As per nurse and patient, no o/n events, patient resting comfortably.    INTERVAL HPI:   Patient was seen and examined at bedside. No complaints at this time. Patient denies: fever, chills, dizziness, weakness, HA, Changes in vision, CP, palpitations, SOB, cough, N/V/D/C, dysuria, changes in bowel movements, LE edema. ROS otherwise negative.    VITAL SIGNS:  T(F): 98.3 (06-05-21 @ 05:30)  HR: 74 (06-05-21 @ 05:30)  BP: 172/76 (06-05-21 @ 05:30)  RR: 18 (06-05-21 @ 05:30)  SpO2: 98% (06-05-21 @ 05:30)  Wt(kg): --    PHYSICAL EXAM:    Constitutional: WDWN, NAD  HEENT: PERRL, EOMI, sclera non-icteric, neck supple, trachea midline, no masses, no JVD, MMM, good dentition  Respiratory: CTA b/l, good air entry b/l, no wheezing, no rhonchi, no rales, without accessory muscle use and no intercostal retractions  Cardiovascular: RRR, normal S1S2, no M/R/G  Gastrointestinal: soft, NTND, no masses palpable, BS normal  Extremities: Warm, well perfused, pulses equal bilateral upper and lower extremities, no edema, no clubbing  Neurological: AAOx3, CN Grossly intact  Skin: Normal temperature, warm, dry    MEDICATIONS  (STANDING):  apixaban 5 milliGRAM(s) Oral every 12 hours  atorvastatin 20 milliGRAM(s) Oral at bedtime  brimonidine 0.2% Ophthalmic Solution 1 Drop(s) Both EYES two times a day  carvedilol 25 milliGRAM(s) Oral every 12 hours  epoetin audrey-epbx (RETACRIT) Injectable 8000 Unit(s) IV Push once  glucagon  Injectable 1 milliGRAM(s) IntraMuscular once  hydrALAZINE 100 milliGRAM(s) Oral every 8 hours  insulin glargine Injectable (LANTUS) 8 Unit(s) SubCutaneous at bedtime  insulin lispro (ADMELOG) corrective regimen sliding scale   SubCutaneous Before meals and at bedtime  insulin lispro Injectable (ADMELOG) 5 Unit(s) SubCutaneous three times a day before meals  ketotifen 0.025% Ophthalmic Solution 1 Drop(s) Both EYES at bedtime  losartan 100 milliGRAM(s) Oral every 24 hours  NIFEdipine XL 90 milliGRAM(s) Oral every 24 hours    MEDICATIONS  (PRN):  acetaminophen   Tablet .. 650 milliGRAM(s) Oral every 6 hours PRN Mild Pain (1 - 3)  traMADol 25 milliGRAM(s) Oral daily PRN Moderate Pain (4 - 6)      Allergies    No Known Allergies    Intolerances        LABS:                        8.0    6.14  )-----------( 279      ( 05 Jun 2021 06:15 )             25.6     06-05    132<L>  |  93<L>  |  63<H>  ----------------------------<  68<L>  5.2   |  24  |  6.47<H>    Ca    9.3      05 Jun 2021 06:15  Phos  4.5     06-05  Mg     2.2     06-05              RADIOLOGY & ADDITIONAL TESTS:  Reviewed OVERNIGHT EVENTS:  As per nurse and patient, no o/n events, patient resting comfortably.    INTERVAL HPI:   Patient was seen and examined at bedside. Patient still reports left knee swelling, but states it is the same.  Patient denies: fever, chills, dizziness, weakness, HA, Changes in vision, CP, palpitations, SOB, cough, N/V/D/C, dysuria, changes in bowel movements, LE edema. ROS otherwise negative.    VITAL SIGNS:  T(F): 98.3 (06-05-21 @ 05:30)  HR: 74 (06-05-21 @ 05:30)  BP: 172/76 (06-05-21 @ 05:30)  RR: 18 (06-05-21 @ 05:30)  SpO2: 98% (06-05-21 @ 05:30)  Wt(kg): --    PHYSICAL EXAM:    Constitutional: WDWN, NAD  HEENT: PERRL, EOMI, sclera non-icteric, neck supple, trachea midline, no masses, no JVD, MMM,  Respiratory: CTA b/l, good air entry b/l, no wheezing, no rhonchi, no rales, without accessory muscle use and no intercostal retractions  Cardiovascular: irregular rhythm, regular rate, normal S1S2, no M/R/G  Gastrointestinal: soft, NTND, no masses palpable, BS normal  Extremities: left knee- edematous, with prepatellar effusion, limited range of motion, nontender to palpation. Warm, well perfused, pulses equal bilateral upper and lower extremities, no edema, no clubbing  Neurological: AAOx3, CN Grossly intact  Skin: Normal temperature, warm, dry    MEDICATIONS  (STANDING):  apixaban 5 milliGRAM(s) Oral every 12 hours  atorvastatin 20 milliGRAM(s) Oral at bedtime  brimonidine 0.2% Ophthalmic Solution 1 Drop(s) Both EYES two times a day  carvedilol 25 milliGRAM(s) Oral every 12 hours  epoetin audrey-epbx (RETACRIT) Injectable 8000 Unit(s) IV Push once  glucagon  Injectable 1 milliGRAM(s) IntraMuscular once  hydrALAZINE 100 milliGRAM(s) Oral every 8 hours  insulin glargine Injectable (LANTUS) 8 Unit(s) SubCutaneous at bedtime  insulin lispro (ADMELOG) corrective regimen sliding scale   SubCutaneous Before meals and at bedtime  insulin lispro Injectable (ADMELOG) 5 Unit(s) SubCutaneous three times a day before meals  ketotifen 0.025% Ophthalmic Solution 1 Drop(s) Both EYES at bedtime  losartan 100 milliGRAM(s) Oral every 24 hours  NIFEdipine XL 90 milliGRAM(s) Oral every 24 hours    MEDICATIONS  (PRN):  acetaminophen   Tablet .. 650 milliGRAM(s) Oral every 6 hours PRN Mild Pain (1 - 3)  traMADol 25 milliGRAM(s) Oral daily PRN Moderate Pain (4 - 6)      Allergies    No Known Allergies    Intolerances        LABS:                        8.0    6.14  )-----------( 279      ( 05 Jun 2021 06:15 )             25.6     06-05    132<L>  |  93<L>  |  63<H>  ----------------------------<  68<L>  5.2   |  24  |  6.47<H>    Ca    9.3      05 Jun 2021 06:15  Phos  4.5     06-05  Mg     2.2     06-05              RADIOLOGY & ADDITIONAL TESTS:  Reviewed

## 2021-06-05 NOTE — DISCHARGE NOTE NURSING/CASE MANAGEMENT/SOCIAL WORK - NSDCFUADDAPPT_GEN_ALL_CORE_FT
You have an appointment made with your Primary Care Doctor, Dr. Davian Meza on 6/21 at 2:45PM.   Phone: [phone]

## 2021-06-05 NOTE — PROGRESS NOTE ADULT - SUBJECTIVE AND OBJECTIVE BOX
Patient is a 66y Male seen and evaluated at bedside.   +residual knee pain   BP elevated, acceptable  denies CP SOB   tolerated last hemodialysis well Thursday   for hemodialysis today per schedule     Meds:    acetaminophen   Tablet .. 650 every 6 hours PRN  apixaban 5 every 12 hours  atorvastatin 20 at bedtime  brimonidine 0.2% Ophthalmic Solution 1 two times a day  carvedilol 25 every 12 hours  glucagon  Injectable 1 once  hydrALAZINE 100 every 8 hours  insulin glargine Injectable (LANTUS) 8 at bedtime  insulin lispro (ADMELOG) corrective regimen sliding scale  Before meals and at bedtime  insulin lispro Injectable (ADMELOG) 5 three times a day before meals  ketotifen 0.025% Ophthalmic Solution 1 at bedtime  losartan 100 every 24 hours  NIFEdipine XL 90 every 24 hours  traMADol 25 daily PRN      T(C): , Max: 36.9 (06-04-21 @ 14:53)  T(F): , Max: 98.4 (06-04-21 @ 14:53)  HR: 78 (06-05-21 @ 09:30)  BP: 163/83 (06-05-21 @ 09:30)  BP(mean): --  RR: 18 (06-05-21 @ 09:30)  SpO2: 94% (06-05-21 @ 09:30)  Wt(kg): --      Review of Systems:  CONSTITUTIONAL: No fever   RESPIRATORY: No shortness of breath, cough  CARDIOVASCULAR: No Chest pain, shortness of breath  GASTROINTESTINAL: No abdominal pain, nausea, vomiting, diarrhea  MUSCULOSKELETAL: +swelling, +pain to LLE       PHYSICAL EXAM:  GENERAL: Alert, awake, oriented x3 on RA   CHEST/LUNG: Bilateral clear breath sounds  HEART: Regular rate and rhythm, no murmur, no gallops, no rub   ABDOMEN: Soft, nontender, non distended  EXTREMITIES: 1+ pedal edema  ACCESS: +LUE AVF w/bruit and thrill       LABS:                        8.0    6.14  )-----------( 279      ( 05 Jun 2021 06:15 )             25.6     06-05    132<L>  |  93<L>  |  63<H>  ----------------------------<  68<L>  5.2   |  24  |  6.47<H>    Ca    9.3      05 Jun 2021 06:15  Phos  4.5     06-05  Mg     2.2     06-05                  RADIOLOGY & ADDITIONAL STUDIES:

## 2021-06-05 NOTE — PROGRESS NOTE ADULT - PROBLEM SELECTOR PLAN 5
Previously on insulin however d/tracey outpt. FSG on admission 300, AG 14  - on Tradjenta at home  - A1C 7.7   -lantus 10 and lispro 5 TID, endo following   - c/w ISS  - continue to monitor Previously on insulin however d/tracey outpt. FSG on admission 300, AG 14  - A1C 7.7   - c/w home Tradjenta, follow up with outpatient PCP for further management, especially if secondary oral agent is necessary   - continue to monitor.

## 2021-06-05 NOTE — PROGRESS NOTE ADULT - PROBLEM SELECTOR PLAN 8
DVT: Eliquis, SCD  GI: none     FULL CODE    Dispo: - PT eval- recommending AALIYAH pending further PT, pt would like to go home. pending home services

## 2021-06-05 NOTE — PROGRESS NOTE ADULT - PROBLEM SELECTOR PROBLEM 3
Type 2 diabetes mellitus without complication, with long-term current use of insulin
HTN (hypertension)
Type 2 diabetes mellitus without complication, with long-term current use of insulin

## 2021-06-05 NOTE — PROGRESS NOTE ADULT - ATTENDING COMMENTS
seen on dialysis, doing well
Initial attending contact date   6/2/21   . See fellow note written above for details. I reviewed the fellow documentation. I have personally seen and examined this patient. I reviewed vitals, labs, medications, cardiac studies, and additional imaging. I agree with the above fellow's findings and plans as written above with the following additions/statements.    -66 M PMH HTN, DM, ?AF on eliquis, ESRD (TTS, LUE fistula), L knee OA p/w knee pain and effusion, incidentally found with abnormal ekg and elevated troponin 0.45  -Initial EKG with misplaced leads  -Repeat EKG with NSR with ST changes sec to LVH, PVC  -ECHO with normal LVEF, mild-mod AS with mean gradient 20, Mod MR, Mod TR with PA press 50  -Elevated troponin likely in setting of ESRD  -No need for further work up at this time given pt is without any anginal equivalents  -BP better controlled on hydralazine 100 mg tid. Cont losartan/coreg/nifedipine  -Parox  a fib: currently NSR. Cont eliquis  -Pt to fu with his outpatient cardiologist upon dc  -Pls reconsult as needed
Pt seen on rounds this afternoon.  Says that his knee is better, and he was able to walk short distances.  Glucoses have been mostly 160-200.  Will increase the Lantus to 12 units (since AM ) but leave the pre-meal lispro at 5 units  Insulin doses are quite modest, so plan would be to have him return to oral agents (Tradjenta) post discharge with an improved diet.  Explained to him that he still may need a second oral agent, and it is possible that he does in fact need insulin.  This would have to be determined as outpt.
Pt hypertensive this AM but BP taken prior to AM antihypertensives being given; FS at 71 this AM but taken after pt was given insulin - will be discharged on home Tradjenta as per Endocrinology recs
Pt. seen and examined by me earlier today; I have read Dr. Lynch's note, I agree w/ her findings and plan of care as documented; f/u Ortho, Cardiology, PT recs

## 2021-06-05 NOTE — PROGRESS NOTE ADULT - PROBLEM SELECTOR PLAN 2
ESRD (TTS, last session today) via LUE fistula however being prepped for peritoneal dialysis. euvolemic currently  - nephro consulted for HD,. last HD 6/3, next is 6/5

## 2021-06-05 NOTE — DISCHARGE NOTE NURSING/CASE MANAGEMENT/SOCIAL WORK - PATIENT PORTAL LINK FT
You can access the FollowMyHealth Patient Portal offered by [facility] by registering at the following website: [email or web address]. By joining [employer]’s FollowMyHealth portal, you will also be able to view your health information using other applications (apps) compatible with our system.

## 2021-06-05 NOTE — PROGRESS NOTE ADULT - PROBLEM SELECTOR PLAN 7
F: none   E: replete prn   N: Renal, CC
cont. EPO, monitor Hb
F: none   E: replete prn   N: Renal, CC
F: none   E: replete prn   N: Renal, CC
cont. EPO, monitor Hb
F: none   E: replete prn   N: Renal, CC

## 2021-06-05 NOTE — PROGRESS NOTE ADULT - PROBLEM SELECTOR PLAN 6
hgb 9.1 on admission likely 2/2 ESRD; no previous to compare.   - continue to monitor   - EPO with HD, consider IV iron if stays hgb 9.1 on admission likely 2/2 ESRD; no previous to compare.   - pt would benefit from IV iron given ESRD, follow up with primary care doctor and nephrologist

## 2021-06-05 NOTE — PROGRESS NOTE ADULT - PROVIDER SPECIALTY LIST ADULT
Internal Medicine
Nephrology
Rehab Medicine
Cardiology
Endocrinology
Internal Medicine
Internal Medicine
Hospitalist
Internal Medicine
Hospitalist

## 2021-06-05 NOTE — PROGRESS NOTE ADULT - ASSESSMENT
66M PMHx ESRD (TTS via LUE fistula), T2DM, HTN, Afib? on Eliquis, L knee OA presents for recurrent Left knee effusion and pain. Nephrology consulted for hemodialysis.     Assessment/Plan:     #ESRD on HD MWF @[facility]   usual Rx 4h 4L  last hemodialysis 6/3 per schedule   next hemodialysis today 6/5 per patient schedule   electrolytes noted   volume status noted   dry weight unknown     #anemia  Hb ~8   recommend IV Iron 200mg x5d  EPO 8k u TIW w/HD   transfusion as per primary team     #renal bone disease   acceptable   no indication for Hectorol at this time     Patient was seen and evaluated on dialysis.     Dialyzer: Optiflux Q526RHq  QB: 400 mL/min  QD: 500 mL/min  K bath: 2  Goal UF: 4L  Duration: 240 min    Patient is tolerating the procedure well.   Continue full hemodialysis treatment as prescribed.    Thank you for the opportunity to participate in the care of your patient. The nephrology service remains available to assist with any questions or concerns. Please feel free to reach us by paging the on-call nephrology fellow for urgent issues or as below.     Riccardo Edge M.D.   PGY-4, Nephrology Fellow   C: [phone]   P: [phone]

## 2021-06-11 DIAGNOSIS — I27.20 PULMONARY HYPERTENSION, UNSPECIFIED: ICD-10-CM

## 2021-06-11 DIAGNOSIS — D50.9 IRON DEFICIENCY ANEMIA, UNSPECIFIED: ICD-10-CM

## 2021-06-11 DIAGNOSIS — M15.4 EROSIVE (OSTEO)ARTHRITIS: ICD-10-CM

## 2021-06-11 DIAGNOSIS — I08.3 COMBINED RHEUMATIC DISORDERS OF MITRAL, AORTIC AND TRICUSPID VALVES: ICD-10-CM

## 2021-06-11 DIAGNOSIS — I12.0 HYPERTENSIVE CHRONIC KIDNEY DISEASE WITH STAGE 5 CHRONIC KIDNEY DISEASE OR END STAGE RENAL DISEASE: ICD-10-CM

## 2021-06-11 DIAGNOSIS — I49.3 VENTRICULAR PREMATURE DEPOLARIZATION: ICD-10-CM

## 2021-06-11 DIAGNOSIS — D63.1 ANEMIA IN CHRONIC KIDNEY DISEASE: ICD-10-CM

## 2021-06-11 DIAGNOSIS — Z79.01 LONG TERM (CURRENT) USE OF ANTICOAGULANTS: ICD-10-CM

## 2021-06-11 DIAGNOSIS — N18.6 END STAGE RENAL DISEASE: ICD-10-CM

## 2021-06-11 DIAGNOSIS — Z89.422 ACQUIRED ABSENCE OF OTHER LEFT TOE(S): ICD-10-CM

## 2021-06-11 DIAGNOSIS — Z99.2 DEPENDENCE ON RENAL DIALYSIS: ICD-10-CM

## 2021-06-11 DIAGNOSIS — E87.5 HYPERKALEMIA: ICD-10-CM

## 2021-06-11 DIAGNOSIS — I48.20 CHRONIC ATRIAL FIBRILLATION, UNSPECIFIED: ICD-10-CM

## 2021-06-11 DIAGNOSIS — M25.462 EFFUSION, LEFT KNEE: ICD-10-CM

## 2021-06-11 DIAGNOSIS — E11.22 TYPE 2 DIABETES MELLITUS WITH DIABETIC CHRONIC KIDNEY DISEASE: ICD-10-CM

## 2021-06-11 DIAGNOSIS — Z79.84 LONG TERM (CURRENT) USE OF ORAL HYPOGLYCEMIC DRUGS: ICD-10-CM

## 2021-06-11 DIAGNOSIS — E11.65 TYPE 2 DIABETES MELLITUS WITH HYPERGLYCEMIA: ICD-10-CM

## 2021-06-22 LAB
CULTURE RESULTS: NO GROWTH — SIGNIFICANT CHANGE UP
SPECIMEN SOURCE: SIGNIFICANT CHANGE UP

## 2022-03-15 NOTE — DISCHARGE NOTE PROVIDER - NSDCCONDITION_GEN_ALL_CORE
Stable Rhofade Pregnancy And Lactation Text: This medication has not been assigned a Pregnancy Risk Category. It is unknown if the medication is excreted in breast milk.